# Patient Record
Sex: FEMALE | Race: WHITE | Employment: UNEMPLOYED | URBAN - METROPOLITAN AREA
[De-identification: names, ages, dates, MRNs, and addresses within clinical notes are randomized per-mention and may not be internally consistent; named-entity substitution may affect disease eponyms.]

---

## 2018-08-02 ENCOUNTER — HOSPITAL ENCOUNTER (EMERGENCY)
Age: 33
Discharge: HOME | End: 2018-08-02
Attending: EMERGENCY MEDICINE
Payer: COMMERCIAL

## 2018-08-02 VITALS
HEIGHT: 63 IN | BODY MASS INDEX: 19.14 KG/M2 | DIASTOLIC BLOOD PRESSURE: 77 MMHG | WEIGHT: 108 LBS | HEART RATE: 68 BPM | RESPIRATION RATE: 16 BRPM | OXYGEN SATURATION: 99 % | TEMPERATURE: 98.4 F | SYSTOLIC BLOOD PRESSURE: 113 MMHG

## 2018-08-02 DIAGNOSIS — R11.2 NON-INTRACTABLE VOMITING WITH NAUSEA, UNSPECIFIED VOMITING TYPE: Primary | ICD-10-CM

## 2018-08-02 LAB
BACTERIA: ABNORMAL /HPF
BASOPHILS ABSOLUTE: 0.1 K/UL (ref 0–0.2)
BASOPHILS RELATIVE PERCENT: 1 %
BILIRUBIN URINE: NEGATIVE
BLOOD, URINE: NEGATIVE
CASTS: ABNORMAL /LPF
CLARITY: CLEAR
COLOR: YELLOW
EOSINOPHILS ABSOLUTE: 0 K/UL (ref 0–0.6)
EOSINOPHILS RELATIVE PERCENT: 0.6 %
EPITHELIAL CELLS, UA: ABNORMAL /HPF
GLUCOSE URINE: NEGATIVE MG/DL
HCG QUALITATIVE: NEGATIVE
HCT VFR BLD CALC: 39.5 % (ref 36–48)
HEMOGLOBIN: 13.2 G/DL (ref 12–16)
KETONES, URINE: NEGATIVE MG/DL
LEUKOCYTE ESTERASE, URINE: NEGATIVE
LIPASE: 25 U/L (ref 13–60)
LYMPHOCYTES ABSOLUTE: 1.3 K/UL (ref 1–5.1)
LYMPHOCYTES RELATIVE PERCENT: 17.5 %
MCH RBC QN AUTO: 31.3 PG (ref 26–34)
MCHC RBC AUTO-ENTMCNC: 33.4 G/DL (ref 31–36)
MCV RBC AUTO: 93.8 FL (ref 80–100)
MICROSCOPIC EXAMINATION: YES
MONOCYTES ABSOLUTE: 0.3 K/UL (ref 0–1.3)
MONOCYTES RELATIVE PERCENT: 3.8 %
MUCUS: ABNORMAL /LPF
NEUTROPHILS ABSOLUTE: 5.7 K/UL (ref 1.7–7.7)
NEUTROPHILS RELATIVE PERCENT: 77.1 %
NITRITE, URINE: NEGATIVE
PDW BLD-RTO: 13 % (ref 12.4–15.4)
PH UA: 8.5
PLATELET # BLD: 252 K/UL (ref 135–450)
PMV BLD AUTO: 8.5 FL (ref 5–10.5)
PROTEIN UA: ABNORMAL MG/DL
RBC # BLD: 4.21 M/UL (ref 4–5.2)
RBC UA: ABNORMAL /HPF (ref 0–2)
SPECIFIC GRAVITY UA: 1.01
URINE REFLEX TO CULTURE: ABNORMAL
URINE TYPE: ABNORMAL
UROBILINOGEN, URINE: 0.2 E.U./DL
WBC # BLD: 7.4 K/UL (ref 4–11)
WBC UA: ABNORMAL /HPF (ref 0–5)

## 2018-08-02 PROCEDURE — 83690 ASSAY OF LIPASE: CPT

## 2018-08-02 PROCEDURE — 81001 URINALYSIS AUTO W/SCOPE: CPT

## 2018-08-02 PROCEDURE — 6360000002 HC RX W HCPCS: Performed by: EMERGENCY MEDICINE

## 2018-08-02 PROCEDURE — 99284 EMERGENCY DEPT VISIT MOD MDM: CPT

## 2018-08-02 PROCEDURE — 96360 HYDRATION IV INFUSION INIT: CPT

## 2018-08-02 PROCEDURE — 84703 CHORIONIC GONADOTROPIN ASSAY: CPT

## 2018-08-02 PROCEDURE — 2580000003 HC RX 258: Performed by: EMERGENCY MEDICINE

## 2018-08-02 PROCEDURE — 85025 COMPLETE CBC W/AUTO DIFF WBC: CPT

## 2018-08-02 RX ORDER — ONDANSETRON 4 MG/1
4 TABLET, ORALLY DISINTEGRATING ORAL ONCE
Status: COMPLETED | OUTPATIENT
Start: 2018-08-02 | End: 2018-08-02

## 2018-08-02 RX ORDER — 0.9 % SODIUM CHLORIDE 0.9 %
1000 INTRAVENOUS SOLUTION INTRAVENOUS ONCE
Status: COMPLETED | OUTPATIENT
Start: 2018-08-02 | End: 2018-08-02

## 2018-08-02 RX ORDER — ONDANSETRON 4 MG/1
4 TABLET, ORALLY DISINTEGRATING ORAL EVERY 8 HOURS PRN
Qty: 20 TABLET | Refills: 0 | Status: SHIPPED | OUTPATIENT
Start: 2018-08-02 | End: 2018-12-26

## 2018-08-02 RX ADMIN — ONDANSETRON 4 MG: 4 TABLET, ORALLY DISINTEGRATING ORAL at 14:31

## 2018-08-02 RX ADMIN — SODIUM CHLORIDE 1000 ML: 9 INJECTION, SOLUTION INTRAVENOUS at 14:31

## 2018-08-02 ASSESSMENT — PAIN DESCRIPTION - LOCATION: LOCATION: ABDOMEN

## 2018-08-02 ASSESSMENT — PAIN DESCRIPTION - PAIN TYPE: TYPE: ACUTE PAIN

## 2018-08-02 ASSESSMENT — PAIN SCALES - GENERAL: PAINLEVEL_OUTOF10: 4

## 2018-08-02 NOTE — ED NOTES
Report received from [facility].       Victoria Myers RN  08/02/18 2989

## 2018-08-02 NOTE — ED PROVIDER NOTES
(ZOFRAN-ODT) disintegrating tablet 4 mg (4 mg Oral Given 8/2/18 6054)        CLINICAL IMPRESSION  1. Non-intractable vomiting with nausea, unspecified vomiting type        Blood pressure 113/77, pulse 68, temperature 98.4 °F (36.9 °C), resp. rate 16, height 5' 3\" (1.6 m), weight 108 lb (49 kg), SpO2 99 %, not currently breastfeeding. DISPOSITION  Miesha Monaco was discharged to home in stable condition. Patient was given scripts for the following medications. I counseled patient how to take these medications. Discharge Medication List as of 8/2/2018  3:27 PM      START taking these medications    Details   ondansetron (ZOFRAN ODT) 4 MG disintegrating tablet Take 1 tablet by mouth every 8 hours as needed for Nausea or Vomiting, Disp-20 tablet, R-0Print             Follow-up with:  Lucretia Duque  [phone]  Schedule an appointment as soon as possible for a visit in 2 days        DISCLAIMER: This chart was created using Dragon dictation software. Efforts were made by me to ensure accuracy, however some errors may be present due to limitations of this technology and occasionally words are not transcribed correctly.        Sheldon Crandall MD  08/06/18 7180

## 2018-08-02 NOTE — ED NOTES
Pt educated on providing clean catch urine. Pt verbalized understanding. Urine collected and sent to lab.        Lyudmila Palma RN  08/02/18 0460

## 2018-11-22 ENCOUNTER — HOSPITAL ENCOUNTER (EMERGENCY)
Age: 33
Discharge: HOME | End: 2018-11-22
Attending: EMERGENCY MEDICINE
Payer: COMMERCIAL

## 2018-11-22 VITALS
BODY MASS INDEX: 20.94 KG/M2 | SYSTOLIC BLOOD PRESSURE: 115 MMHG | WEIGHT: 118.2 LBS | RESPIRATION RATE: 12 BRPM | DIASTOLIC BLOOD PRESSURE: 72 MMHG | HEIGHT: 63 IN | HEART RATE: 94 BPM | OXYGEN SATURATION: 100 % | TEMPERATURE: 98.5 F

## 2018-11-22 DIAGNOSIS — M79.605 CHRONIC PAIN OF LEFT LOWER EXTREMITY: Primary | ICD-10-CM

## 2018-11-22 DIAGNOSIS — G89.29 CHRONIC PAIN OF LEFT LOWER EXTREMITY: Primary | ICD-10-CM

## 2018-11-22 PROCEDURE — 99283 EMERGENCY DEPT VISIT LOW MDM: CPT

## 2018-11-22 RX ORDER — GABAPENTIN 300 MG/1
300 CAPSULE ORAL 2 TIMES DAILY
COMMUNITY
End: 2021-06-10

## 2018-11-22 ASSESSMENT — PAIN SCALES - GENERAL: PAINLEVEL_OUTOF10: 9

## 2018-11-22 ASSESSMENT — PAIN DESCRIPTION - LOCATION: LOCATION: LEG

## 2018-11-22 ASSESSMENT — PAIN DESCRIPTION - FREQUENCY: FREQUENCY: CONTINUOUS

## 2018-11-22 ASSESSMENT — PAIN DESCRIPTION - PAIN TYPE: TYPE: CHRONIC PAIN

## 2018-11-22 ASSESSMENT — PAIN DESCRIPTION - ORIENTATION: ORIENTATION: LEFT

## 2018-11-22 ASSESSMENT — PAIN DESCRIPTION - DESCRIPTORS: DESCRIPTORS: ACHING;BURNING

## 2018-11-23 NOTE — ED PROVIDER NOTES
hours as needed for Nausea or Vomiting, Disp-20 tablet, R-0Print      QUEtiapine (SEROQUEL) 100 MG tablet Take 100 mg by mouth nightlyHistorical Med      NONFORMULARY Historical Med      ibuprofen (IBU) 600 MG tablet Take 1 tablet by mouth every 8 hours as needed for Pain, Disp-20 tablet, R-1Print      albuterol (PROVENTIL HFA;VENTOLIN HFA) 108 (90 BASE) MCG/ACT inhaler Inhale 2 puffs into the lungs every 6 hours as needed. Allergies   Allergen Reactions    Keflex [Cephalexin] Nausea And Vomiting        Physical Exam       ED Triage Vitals [11/22/18 2035]   BP Temp Temp Source Pulse Resp SpO2 Height Weight   115/72 98.5 °F (36.9 °C) Oral 94 12 100 % 5' 3\" (1.6 m) 118 lb 3.2 oz (53.6 kg)       Physical Exam   Constitutional: She is oriented to person, place, and time. She appears well-developed and well-nourished. HENT:   Head: Normocephalic and atraumatic. Eyes: Pupils are equal, round, and reactive to light. Conjunctivae are normal.   Musculoskeletal: Normal range of motion. She exhibits no edema, tenderness or deformity. Left knee: Normal.        Left lower leg: Normal.   Neurological: She is alert and oriented to person, place, and time. Skin: Skin is warm and dry. No rash noted. No erythema. No pallor. Psychiatric: She has a normal mood and affect. Her behavior is normal. Thought content normal.   Nursing note and vitals reviewed. Diagnostics   Labs:  No results found for this visit on 11/22/18. Radiographs:  No results found.     Procedures/EKG:   Procedures      ED Course and MDM          MDM  Number of Diagnoses or Management Options  Chronic pain of left lower extremity: established and worsening  Risk of Complications, Morbidity, and/or Mortality  Presenting problems: minimal  Diagnostic procedures: minimal  Management options: minimal        In brief, Ihsan Martinez is a 29 y.o. female who presented to the emergency department With complaints of pain to her left proximal tibia

## 2018-12-26 ENCOUNTER — HOSPITAL ENCOUNTER (EMERGENCY)
Age: 33
Discharge: HOME | End: 2018-12-26
Attending: EMERGENCY MEDICINE
Payer: COMMERCIAL

## 2018-12-26 VITALS
SYSTOLIC BLOOD PRESSURE: 105 MMHG | TEMPERATURE: 98 F | BODY MASS INDEX: 20.91 KG/M2 | HEIGHT: 63 IN | WEIGHT: 118 LBS | RESPIRATION RATE: 14 BRPM | DIASTOLIC BLOOD PRESSURE: 61 MMHG | OXYGEN SATURATION: 100 % | HEART RATE: 92 BPM

## 2018-12-26 DIAGNOSIS — K92.1 BLACK STOOL: ICD-10-CM

## 2018-12-26 DIAGNOSIS — R19.7 NAUSEA VOMITING AND DIARRHEA: ICD-10-CM

## 2018-12-26 DIAGNOSIS — R11.2 NAUSEA VOMITING AND DIARRHEA: ICD-10-CM

## 2018-12-26 DIAGNOSIS — G44.89 OTHER HEADACHE SYNDROME: Primary | ICD-10-CM

## 2018-12-26 LAB
BACTERIA: ABNORMAL /HPF
BILIRUBIN URINE: ABNORMAL
BLOOD, URINE: NEGATIVE
CLARITY: CLEAR
COLOR: YELLOW
EPITHELIAL CELLS, UA: ABNORMAL /HPF
GLUCOSE URINE: NEGATIVE MG/DL
HCG(URINE) PREGNANCY TEST: NEGATIVE
KETONES, URINE: >=80 MG/DL
LEUKOCYTE ESTERASE, URINE: ABNORMAL
MICROSCOPIC EXAMINATION: YES
MUCUS: ABNORMAL /LPF
NITRITE, URINE: NEGATIVE
PH UA: 7.5
PROTEIN UA: ABNORMAL MG/DL
RBC UA: ABNORMAL /HPF (ref 0–2)
SPECIFIC GRAVITY UA: 1.02
URINE TYPE: ABNORMAL
UROBILINOGEN, URINE: 1 E.U./DL
WBC UA: ABNORMAL /HPF (ref 0–5)

## 2018-12-26 PROCEDURE — 6370000000 HC RX 637 (ALT 250 FOR IP): Performed by: EMERGENCY MEDICINE

## 2018-12-26 PROCEDURE — 84703 CHORIONIC GONADOTROPIN ASSAY: CPT

## 2018-12-26 PROCEDURE — 99284 EMERGENCY DEPT VISIT MOD MDM: CPT

## 2018-12-26 PROCEDURE — 81001 URINALYSIS AUTO W/SCOPE: CPT

## 2018-12-26 PROCEDURE — 6360000002 HC RX W HCPCS: Performed by: EMERGENCY MEDICINE

## 2018-12-26 RX ORDER — ONDANSETRON 4 MG/1
8 TABLET, ORALLY DISINTEGRATING ORAL ONCE
Status: COMPLETED | OUTPATIENT
Start: 2018-12-26 | End: 2018-12-26

## 2018-12-26 RX ORDER — FAMOTIDINE 20 MG/1
20 TABLET, FILM COATED ORAL 2 TIMES DAILY
Qty: 60 TABLET | Refills: 0 | Status: SHIPPED | OUTPATIENT
Start: 2018-12-26 | End: 2021-07-28

## 2018-12-26 RX ORDER — MIRTAZAPINE 15 MG/1
15 TABLET, FILM COATED ORAL NIGHTLY
COMMUNITY

## 2018-12-26 RX ORDER — FAMOTIDINE 20 MG/1
20 TABLET, FILM COATED ORAL ONCE
Status: COMPLETED | OUTPATIENT
Start: 2018-12-26 | End: 2018-12-26

## 2018-12-26 RX ORDER — ONDANSETRON 4 MG/1
4 TABLET, FILM COATED ORAL EVERY 8 HOURS PRN
Qty: 10 TABLET | Refills: 0 | Status: SHIPPED | OUTPATIENT
Start: 2018-12-26

## 2018-12-26 RX ADMIN — ONDANSETRON 8 MG: 4 TABLET, ORALLY DISINTEGRATING ORAL at 21:11

## 2018-12-26 RX ADMIN — FAMOTIDINE 20 MG: 20 TABLET ORAL at 21:11

## 2018-12-26 ASSESSMENT — PAIN SCALES - GENERAL: PAINLEVEL_OUTOF10: 10

## 2018-12-26 ASSESSMENT — PAIN DESCRIPTION - PAIN TYPE: TYPE: ACUTE PAIN

## 2018-12-26 ASSESSMENT — PAIN DESCRIPTION - FREQUENCY: FREQUENCY: INTERMITTENT

## 2018-12-26 ASSESSMENT — PAIN DESCRIPTION - LOCATION: LOCATION: HEAD;ABDOMEN

## 2018-12-26 ASSESSMENT — PAIN DESCRIPTION - DESCRIPTORS: DESCRIPTORS: POUNDING

## 2018-12-27 NOTE — ED PROVIDER NOTES
albuterol (PROVENTIL HFA;VENTOLIN HFA) 108 (90 BASE) MCG/ACT inhaler Inhale 2 puffs into the lungs every 6 hours as needed. Allergies   Allergen Reactions    Keflex [Cephalexin] Nausea And Vomiting     Nursing Notes Reviewed    Physical Exam:  ED Triage Vitals [12/26/18 2014]   Enc Vitals Group      /61      Pulse 92      Resp 14      Temp 98 °F (36.7 °C)      Temp Source Oral      SpO2 100 %      Weight 118 lb (53.5 kg)      Height 5' 3\" (1.6 m)      Head Circumference       Peak Flow       Pain Score       Pain Loc       Pain Edu? Excl. in [address]? CONSTITUTIONAL:  Well-nourished well-hydrated. Thin adult female resting comfortably, talking on phone. Polite pleasant. NAD. Awake alert and oriented ×3. Cooperative. Follows commands. GCS of 15  HEAD: Normocephalic atraumatic head. EYES: Pupils equal round and reactive to light. EOMi. Conjunctiva pink   ENT: Nares clear. MMM. Posterior pharynx no erythema swelling lesions. Uvula is midline. NECK: Supple. Full range of motion. Trachea is midline  CARDIOLOGY: Adequate peripheral perfusion. S1 and S2.  Regular rate and rhythm. No murmurs rubs or gallops. No JVD. No lower extremity edema. +2 radial pulses bilaterally  RESPIRATORY: Nonlabored. Clear to auscultation bilaterally with no rhonchi wheezes or crackles. ABDOMEN: Positive bowel sounds. Soft nondistended nontender. No CVA tenderness. Rectal: no lesions rash noted. Good rectal tone. No stool in vault. No blood noted on finger tip. Brown mucus. Attempted to send for hemoccult however specimen not adequate. MUSCULOSKELETAL: No gross deformities. Moves all four extremities, good strength and tone in all four extremities. SKIN: No rash. No petechiae. Warm and dry. NEUROLOGY: Sensation is intact. MDM:  Differential diagnosis includes Viral bacterial gastroenteritis flu UTI GI bleed etc.  Abdomen is benign. Vital signs are stable. Does not appear to be anemic. Urine pregnancy test is negative. Urinalysis does show ketones. Was medicated. Is able to tolerate p.o. Encouraged to increase p.o. fluid intake. Also shows trace leukocyte Estrace WBCs bacteria. However patient denies urinary symptoms of dysuria urgency frequency hematuria. No change in urine output odor color. And so culture was sent. We'll discharge with close follow-up. Zofran Pepcid. Clear liquid brat diet. Tylenol for pain control. No NSAIDS with reported black stools. Increase fluid hydration. Close follow-up with primary care doctor as well as GI regarding the reported black stools. Agreeable does verbalize understanding and has no further questions    Final Impression:  1. Acute nausea vomiting diarrhea  2. Dehydration  3. Headache  4. Reported black stools    Discharge referral (if applicable):  No follow-up provider specified.     Discharge medications (if applicable):  New Prescriptions    No medications on file       DISPOSITION: home  CONDITION: fair    (Please note that portions of this note may have been completed with a voice recognition program. Efforts were made to edit the dictations but occasionally words are mis-transcribed.)    MD Chelly Cintron MD  12/27/18 9630

## 2019-05-09 ENCOUNTER — HOSPITAL ENCOUNTER (EMERGENCY)
Age: 34
Discharge: HOME | End: 2019-05-09
Payer: COMMERCIAL

## 2019-05-09 ENCOUNTER — APPOINTMENT (OUTPATIENT)
Dept: GENERAL RADIOLOGY | Age: 34
End: 2019-05-09
Payer: COMMERCIAL

## 2019-05-09 VITALS
OXYGEN SATURATION: 100 % | HEIGHT: 63 IN | BODY MASS INDEX: 20.73 KG/M2 | HEART RATE: 99 BPM | WEIGHT: 117 LBS | DIASTOLIC BLOOD PRESSURE: 82 MMHG | SYSTOLIC BLOOD PRESSURE: 121 MMHG | TEMPERATURE: 98.9 F | RESPIRATION RATE: 18 BRPM

## 2019-05-09 DIAGNOSIS — S39.012A STRAIN OF LUMBAR REGION, INITIAL ENCOUNTER: Primary | ICD-10-CM

## 2019-05-09 LAB — HCG(URINE) PREGNANCY TEST: NEGATIVE

## 2019-05-09 PROCEDURE — 97161 PT EVAL LOW COMPLEX 20 MIN: CPT

## 2019-05-09 PROCEDURE — 72100 X-RAY EXAM L-S SPINE 2/3 VWS: CPT

## 2019-05-09 PROCEDURE — 97110 THERAPEUTIC EXERCISES: CPT

## 2019-05-09 PROCEDURE — 99283 EMERGENCY DEPT VISIT LOW MDM: CPT

## 2019-05-09 PROCEDURE — 97140 MANUAL THERAPY 1/> REGIONS: CPT

## 2019-05-09 PROCEDURE — 6370000000 HC RX 637 (ALT 250 FOR IP): Performed by: PHYSICIAN ASSISTANT

## 2019-05-09 PROCEDURE — 84703 CHORIONIC GONADOTROPIN ASSAY: CPT

## 2019-05-09 RX ORDER — MIRTAZAPINE 15 MG/1
30 TABLET, FILM COATED ORAL NIGHTLY
COMMUNITY
Start: 2019-04-23

## 2019-05-09 RX ORDER — CYCLOBENZAPRINE HCL 10 MG
10 TABLET ORAL ONCE
Status: COMPLETED | OUTPATIENT
Start: 2019-05-09 | End: 2019-05-09

## 2019-05-09 RX ORDER — IBUPROFEN 600 MG/1
600 TABLET ORAL EVERY 8 HOURS PRN
Qty: 20 TABLET | Refills: 0 | Status: SHIPPED | OUTPATIENT
Start: 2019-05-09

## 2019-05-09 RX ORDER — IBUPROFEN 600 MG/1
600 TABLET ORAL ONCE
Status: COMPLETED | OUTPATIENT
Start: 2019-05-09 | End: 2019-05-09

## 2019-05-09 RX ORDER — MELOXICAM 7.5 MG/1
7.5 TABLET ORAL DAILY
COMMUNITY
Start: 2019-05-02

## 2019-05-09 RX ORDER — LIDOCAINE 4 G/G
1 PATCH TOPICAL ONCE
Status: DISCONTINUED | OUTPATIENT
Start: 2019-05-09 | End: 2019-05-09 | Stop reason: HOSPADM

## 2019-05-09 RX ORDER — CYCLOBENZAPRINE HCL 10 MG
10 TABLET ORAL 3 TIMES DAILY PRN
Qty: 20 TABLET | Refills: 0 | Status: SHIPPED | OUTPATIENT
Start: 2019-05-09 | End: 2019-05-16

## 2019-05-09 RX ORDER — CYCLOBENZAPRINE HCL 10 MG
10 TABLET ORAL 3 TIMES DAILY PRN
COMMUNITY
Start: 2019-04-05

## 2019-05-09 RX ADMIN — CYCLOBENZAPRINE HYDROCHLORIDE 10 MG: 10 TABLET, FILM COATED ORAL at 15:45

## 2019-05-09 RX ADMIN — IBUPROFEN 600 MG: 600 TABLET, FILM COATED ORAL at 15:45

## 2019-05-09 ASSESSMENT — PAIN DESCRIPTION - PAIN TYPE: TYPE: ACUTE PAIN

## 2019-05-09 ASSESSMENT — PAIN DESCRIPTION - ORIENTATION: ORIENTATION: MID

## 2019-05-09 ASSESSMENT — PAIN SCALES - GENERAL
PAINLEVEL_OUTOF10: 6
PAINLEVEL_OUTOF10: 6

## 2019-05-09 ASSESSMENT — PAIN DESCRIPTION - FREQUENCY: FREQUENCY: CONTINUOUS

## 2019-05-09 ASSESSMENT — PAIN DESCRIPTION - DESCRIPTORS: DESCRIPTORS: ACHING

## 2019-05-09 ASSESSMENT — PAIN DESCRIPTION - LOCATION: LOCATION: BACK

## 2019-05-09 ASSESSMENT — ENCOUNTER SYMPTOMS
GASTROINTESTINAL NEGATIVE: 1
BACK PAIN: 1

## 2019-05-09 NOTE — PROGRESS NOTES
Outpatient Physical Therapy   Phone: [phone], Fax: [phone]   ED Physical Therapy Phone: [phone]    LUMBOSACRAL PHYSICAL THERAPY EVALUATION  EMERGENCY DEPARTMENT     Received referral for Physical Therapy Evaluation in the Emergency Department. Pt educated to role of PT in the ED, and pt agreeable to proceed with evaluation. Evaluation Date: 5/9/2019     Patient Name: Irene Gonzalez   YOB: 1985    Medical Diagnosis:  Back pain  Treatment Diagnosis:  SIJ dysfunction  Onset Date:   5/6/19   Referral Date: 5/9/2019    Referring Provider: Kira Hester PA-C  Restrictions/Precautions:        SUBJECTIVE FINDINGS       History of Present Illness:      Pt presents with c/o low back pain. Pt states that the pain has gotten worse in the past three days. Pain is localized to L low back and radiates to L thigh and groin. Denies numbness/tingling. Pain       Patient describes pain to be shooting, grabbing, stabbing   Patient reports 4/10 pain at present and 6/10 pain at its worst.  Worsened by bending over, long sitting  Improved by warm bath   Pt. reports pain with coughing, sneezing and laughing:  []   Yes [x]   No   Pt. reports bowel and bladder changes:   []   Yes []   No   Current Functional Limitations:    PLOF:    Pt's sleep is affected? []   Yes []   No  []   N/A    OBJECTIVE FINDINGS       Imaging Results:    Xray in ED, 5/9/18:      FINDINGS:   Lumbar vertebral bodies are normal in height and alignment. No evidence of   fracture. Visualized sacrum is unremarkable.       No significant degenerative changes.        Posture    []   Forward head  []   Forward flexed trunk   []   Pronounced CT junction    []   Increased thoracic kyphosis   [x]   Increased lumbar lordosis   []   Scoliosis   []   Swayback  []   Decreased WB on   []   R   []   L   []   Scapular winging  []   Other:       Palpation      Patient reported tenderness with palpation  [x]   Yes   []   No   [] NT  Location:  + Graham's sign L SIJ, L lumbar paraspinals  PT notes warmth  []   Yes   [x]   No   []   NT  Location:   PT notes increased muscle tone   []   Yes   [x]   No   []   NT  Location:   Ligament tenderness/provocation:   [x]   Yes   []   No   []   NT  Location:   L SIJ    Gait/Steps/Balance       Deviations on a level linoleum surface include dec stance time LLE, dec step length LLE    [x]  All balance WFL unless otherwise noted below:  Static/ Dynamic Sitting:  Static/Dynamic Standing:    Quick Tests/Functional Myotome Tests:     Heel Walk (L4): [x]   Able to perform WNL       []   Unable to perform         []   NT    Toe Walk (S1):  [x]   Able to perform WNL     []   Unable to perform     []   NT    SI Special Tests     SI Special Test Findings   Standing Landmarks [x]  Iliac Crests Equal   [] R High  [] L High  []  PSIS Equal   []  R High  [x]  L High     Standing Flexion Test []   WFL     [x]   Positive R []   Positive L   Seated Landmarks [x]  Iliac Crests Equal   []  R High   [] L High  []  PSIS Equal   []  R High  [x]  L High     Seated Flexion Test []   WFL     [x]   Positive R []   Positive L   Sit up Test/Long sit test []   NEG     [x]   Positive - Comment below:  R short to long   Sacral Sulci Test [x]   [facility]     []   Positive R []   Positive L   SI distraction [x]   NEG      []   Positive R []   Positive L     SI compression [x]   NEG     []   Positive R []   Positive L   Sacral thrust tests []   NEG      []   Positive R [x]   Positive L   Prone knee bend test []   NEG     [x]   Positive     []   NT  Comments:     Lumbar Special Tests     Lumbar Special Test Findings   Straight Leg Raise [x]   NEG    []   Positive R []   Positive L   Crams []   NEG    []   Positive R []   Positive L   Dural Tension/Slump test []   NEG    []   Positive R []   Positive L   Distraction [x]   NEG    []   Inc pain []   Dec pain   Compression [x]   NEG    []   Inc pain []   Dec pain     Lumbar Range of Motion/Strength Testing      [x] All WNL except as marked below  ROM (*denotes pain) AROM PROM COMMENTS   Flexion 25% normal range in standing  Able to achieve full range in sitting   Extension 50% normal range     Sidebending Left 75% normal range     Sidebending Right 75% normal range     Rotation Left    []   Seated  []   Standing       Rotation Right    []   Seated  []   Standing         Sensation/Motor Function   [x] All dermatomes WNL except as marked below   [x] All  myotomes WNL except as marked below      Dermatome Left Right Myotome Left Right   Anterior groin, 2-3 inches below ASIS (L1-L2)   Hip Flexion (L1-L2)     Middle third anterior thigh (L3)   Hip adduction (L3)     Patella and med malleolus (L4)   Knee extension (L3,4)     Fibular head and dorsum of foot (L5)   Ankle dorsiflexion (L4)     Lateral side and plantar surface of foot (S1)   Hip abduction (L5)     Medial aspect of posterior thigh (S2)   Great toe extension (L5)     Perianal area (S3,4)   Knee flexion (L5,S1)        Ankle plantarflexion (S1,2)       Reflexes/Trunk Strength    [x] All WNL except as marked below     Reflex Left Right Strength Strength   Quadriceps (L3,4) 2+ 2+ Trunk Extensors    Achilles (S1,2) 2+ 2+ Gluteals    Ankle clonus neg neg Abdominals    Juan          Flexibility       Hip flexors/Philippe:  []   WFL []   Tight      Hamstrings:    []   WFL []   Tight       Gastrocs:    []   WFL []   Tight   Obers:    []   WFL []   Tight       TREATMENT  Educated on anatomy, physiology, and biomechanics of SIJ, pelvic alignment, lumbar spine - with visual aids. Pt verbalized understanding and all of patient's questions answered to satisfaction. Instructed to ice over L SIJ. Business card and HEP issued.      Manual tx:   R supine lumbopelvic roll  MET correction of R post pelvic rotation  Long axis traction of RLE and LLE in plane of the SIJ    Therex:  Knee rocks, bridging, hand/heel rock    Equipment: N/A      ASSESSMENT     Pt presenting to ED with c/o back pain. Through evaluation and examination, identified findings consistent with SIJ dysfunction. Preformed manual therapy and issued HEP. PT recommending pt follow-up with OP PT. Discussed findings and recommendations with referring provider. PLAN OF CARE     One time visit in the ED. Thank you for the referral of this patient.       Timed Code Treatment Minutes:  25   minutes    Total Treatment Time: [facility], DPT # [id number]

## 2019-05-09 NOTE — ED NOTES
Reviewed discharge instructions with pt, verbalized understanding,  Denies questions at this time. Ambulated off unit without assistance.       Yue Villanueva RN  05/09/19 1228
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
## 2021-05-26 ENCOUNTER — APPOINTMENT (OUTPATIENT)
Dept: GENERAL RADIOLOGY | Age: 36
End: 2021-05-26
Payer: COMMERCIAL

## 2021-05-26 ENCOUNTER — HOSPITAL ENCOUNTER (EMERGENCY)
Age: 36
Discharge: HOME | End: 2021-05-26
Payer: COMMERCIAL

## 2021-05-26 ENCOUNTER — APPOINTMENT (OUTPATIENT)
Dept: CT IMAGING | Age: 36
End: 2021-05-26
Payer: COMMERCIAL

## 2021-05-26 VITALS
OXYGEN SATURATION: 100 % | WEIGHT: 116 LBS | HEIGHT: 63 IN | RESPIRATION RATE: 16 BRPM | TEMPERATURE: 98.6 F | DIASTOLIC BLOOD PRESSURE: 78 MMHG | HEART RATE: 62 BPM | SYSTOLIC BLOOD PRESSURE: 132 MMHG | BODY MASS INDEX: 20.55 KG/M2

## 2021-05-26 DIAGNOSIS — N76.0 BACTERIAL VAGINOSIS: ICD-10-CM

## 2021-05-26 DIAGNOSIS — B96.89 BACTERIAL VAGINOSIS: ICD-10-CM

## 2021-05-26 DIAGNOSIS — R10.12 LEFT UPPER QUADRANT ABDOMINAL PAIN: Primary | ICD-10-CM

## 2021-05-26 LAB
A/G RATIO: 1.7 (ref 1.1–2.2)
ALBUMIN SERPL-MCNC: 4.8 G/DL (ref 3.4–5)
ALP BLD-CCNC: 56 U/L (ref 40–129)
ALT SERPL-CCNC: 8 U/L (ref 10–40)
ANION GAP SERPL CALCULATED.3IONS-SCNC: 9 MMOL/L (ref 3–16)
AST SERPL-CCNC: 9 U/L (ref 15–37)
BACTERIA WET PREP: ABNORMAL
BASOPHILS ABSOLUTE: 0 K/UL (ref 0–0.2)
BASOPHILS RELATIVE PERCENT: 0.4 %
BILIRUB SERPL-MCNC: 0.4 MG/DL (ref 0–1)
BILIRUBIN URINE: NEGATIVE
BLOOD, URINE: NEGATIVE
BUN BLDV-MCNC: 13 MG/DL (ref 7–20)
CALCIUM SERPL-MCNC: 9.3 MG/DL (ref 8.3–10.6)
CHLORIDE BLD-SCNC: 104 MMOL/L (ref 99–110)
CLARITY: CLEAR
CLUE CELLS: ABNORMAL
CO2: 25 MMOL/L (ref 21–32)
COLOR: YELLOW
CREAT SERPL-MCNC: 0.8 MG/DL (ref 0.6–1.1)
D DIMER: 329 NG/ML DDU (ref 0–229)
EKG ATRIAL RATE: 58 BPM
EKG DIAGNOSIS: NORMAL
EKG P AXIS: 83 DEGREES
EKG P-R INTERVAL: 126 MS
EKG Q-T INTERVAL: 414 MS
EKG QRS DURATION: 76 MS
EKG QTC CALCULATION (BAZETT): 406 MS
EKG R AXIS: 47 DEGREES
EKG T AXIS: 10 DEGREES
EKG VENTRICULAR RATE: 58 BPM
EOSINOPHILS ABSOLUTE: 0 K/UL (ref 0–0.6)
EOSINOPHILS RELATIVE PERCENT: 0.2 %
EPITHELIAL CELLS WET PREP: ABNORMAL
GFR AFRICAN AMERICAN: >60
GFR NON-AFRICAN AMERICAN: >60
GLOBULIN: 2.8 G/DL
GLUCOSE BLD-MCNC: 89 MG/DL (ref 70–99)
GLUCOSE URINE: NEGATIVE MG/DL
HCG QUALITATIVE: NEGATIVE
HCT VFR BLD CALC: 41.6 % (ref 36–48)
HEMOGLOBIN: 13.9 G/DL (ref 12–16)
KETONES, URINE: ABNORMAL MG/DL
LEUKOCYTE ESTERASE, URINE: NEGATIVE
LIPASE: 23 U/L (ref 13–60)
LYMPHOCYTES ABSOLUTE: 1.2 K/UL (ref 1–5.1)
LYMPHOCYTES RELATIVE PERCENT: 12.4 %
MCH RBC QN AUTO: 31.6 PG (ref 26–34)
MCHC RBC AUTO-ENTMCNC: 33.5 G/DL (ref 31–36)
MCV RBC AUTO: 94.5 FL (ref 80–100)
MICROSCOPIC EXAMINATION: ABNORMAL
MONOCYTES ABSOLUTE: 0.8 K/UL (ref 0–1.3)
MONOCYTES RELATIVE PERCENT: 7.9 %
NEUTROPHILS ABSOLUTE: 7.9 K/UL (ref 1.7–7.7)
NEUTROPHILS RELATIVE PERCENT: 79.1 %
NITRITE, URINE: NEGATIVE
PDW BLD-RTO: 13.1 % (ref 12.4–15.4)
PH UA: 6 (ref 5–8)
PLATELET # BLD: 299 K/UL (ref 135–450)
PMV BLD AUTO: 9.1 FL (ref 5–10.5)
POTASSIUM REFLEX MAGNESIUM: 3.8 MMOL/L (ref 3.5–5.1)
PROTEIN UA: NEGATIVE MG/DL
RBC # BLD: 4.4 M/UL (ref 4–5.2)
RBC WET PREP: ABNORMAL
SODIUM BLD-SCNC: 138 MMOL/L (ref 136–145)
SOURCE WET PREP: ABNORMAL
SPECIFIC GRAVITY UA: >=1.03 (ref 1–1.03)
TOTAL PROTEIN: 7.6 G/DL (ref 6.4–8.2)
TRICHOMONAS PREP: ABNORMAL
TROPONIN: <0.01 NG/ML
URINE REFLEX TO CULTURE: ABNORMAL
URINE TYPE: ABNORMAL
UROBILINOGEN, URINE: 1 E.U./DL
WBC # BLD: 10 K/UL (ref 4–11)
WBC WET PREP: ABNORMAL
YEAST WET PREP: ABNORMAL

## 2021-05-26 PROCEDURE — 96374 THER/PROPH/DIAG INJ IV PUSH: CPT

## 2021-05-26 PROCEDURE — 84484 ASSAY OF TROPONIN QUANT: CPT

## 2021-05-26 PROCEDURE — 81003 URINALYSIS AUTO W/O SCOPE: CPT

## 2021-05-26 PROCEDURE — 6360000004 HC RX CONTRAST MEDICATION: Performed by: NURSE PRACTITIONER

## 2021-05-26 PROCEDURE — 83690 ASSAY OF LIPASE: CPT

## 2021-05-26 PROCEDURE — 85379 FIBRIN DEGRADATION QUANT: CPT

## 2021-05-26 PROCEDURE — 36415 COLL VENOUS BLD VENIPUNCTURE: CPT

## 2021-05-26 PROCEDURE — 71260 CT THORAX DX C+: CPT

## 2021-05-26 PROCEDURE — 6360000002 HC RX W HCPCS: Performed by: NURSE PRACTITIONER

## 2021-05-26 PROCEDURE — 85025 COMPLETE CBC W/AUTO DIFF WBC: CPT

## 2021-05-26 PROCEDURE — 87591 N.GONORRHOEAE DNA AMP PROB: CPT

## 2021-05-26 PROCEDURE — 87210 SMEAR WET MOUNT SALINE/INK: CPT

## 2021-05-26 PROCEDURE — 6370000000 HC RX 637 (ALT 250 FOR IP): Performed by: NURSE PRACTITIONER

## 2021-05-26 PROCEDURE — 2580000003 HC RX 258: Performed by: NURSE PRACTITIONER

## 2021-05-26 PROCEDURE — 93010 ELECTROCARDIOGRAM REPORT: CPT | Performed by: INTERNAL MEDICINE

## 2021-05-26 PROCEDURE — 74177 CT ABD & PELVIS W/CONTRAST: CPT

## 2021-05-26 PROCEDURE — 84703 CHORIONIC GONADOTROPIN ASSAY: CPT

## 2021-05-26 PROCEDURE — 87491 CHLMYD TRACH DNA AMP PROBE: CPT

## 2021-05-26 PROCEDURE — 99283 EMERGENCY DEPT VISIT LOW MDM: CPT

## 2021-05-26 PROCEDURE — 80053 COMPREHEN METABOLIC PANEL: CPT

## 2021-05-26 PROCEDURE — 93005 ELECTROCARDIOGRAM TRACING: CPT | Performed by: NURSE PRACTITIONER

## 2021-05-26 PROCEDURE — 71046 X-RAY EXAM CHEST 2 VIEWS: CPT

## 2021-05-26 RX ORDER — 0.9 % SODIUM CHLORIDE 0.9 %
1000 INTRAVENOUS SOLUTION INTRAVENOUS ONCE
Status: COMPLETED | OUTPATIENT
Start: 2021-05-26 | End: 2021-05-26

## 2021-05-26 RX ORDER — METRONIDAZOLE 500 MG/1
500 TABLET ORAL 2 TIMES DAILY
Qty: 14 TABLET | Refills: 0 | Status: SHIPPED | OUTPATIENT
Start: 2021-05-26 | End: 2021-06-02

## 2021-05-26 RX ORDER — ONDANSETRON 2 MG/ML
4 INJECTION INTRAMUSCULAR; INTRAVENOUS ONCE
Status: COMPLETED | OUTPATIENT
Start: 2021-05-26 | End: 2021-05-26

## 2021-05-26 RX ORDER — ONDANSETRON 4 MG/1
4 TABLET, FILM COATED ORAL EVERY 8 HOURS PRN
Qty: 20 TABLET | Refills: 0 | Status: SHIPPED | OUTPATIENT
Start: 2021-05-26

## 2021-05-26 RX ORDER — METRONIDAZOLE 250 MG/1
500 TABLET ORAL ONCE
Status: COMPLETED | OUTPATIENT
Start: 2021-05-26 | End: 2021-05-26

## 2021-05-26 RX ORDER — DICYCLOMINE HYDROCHLORIDE 10 MG/1
10 CAPSULE ORAL
Qty: 120 CAPSULE | Refills: 3 | Status: SHIPPED | OUTPATIENT
Start: 2021-05-26

## 2021-05-26 RX ADMIN — SODIUM CHLORIDE 1000 ML: 9 INJECTION, SOLUTION INTRAVENOUS at 12:28

## 2021-05-26 RX ADMIN — IOPAMIDOL 85 ML: 755 INJECTION, SOLUTION INTRAVENOUS at 13:38

## 2021-05-26 RX ADMIN — METRONIDAZOLE 500 MG: 250 TABLET ORAL at 14:32

## 2021-05-26 RX ADMIN — ONDANSETRON HYDROCHLORIDE 4 MG: 2 INJECTION, SOLUTION INTRAMUSCULAR; INTRAVENOUS at 12:27

## 2021-05-26 ASSESSMENT — ENCOUNTER SYMPTOMS
SORE THROAT: 0
VOMITING: 0
COLOR CHANGE: 0
NAUSEA: 0
SHORTNESS OF BREATH: 0
RHINORRHEA: 0
DIARRHEA: 0
ABDOMINAL PAIN: 1

## 2021-05-26 NOTE — ED PROVIDER NOTES
Evaluated by [id number] [facility] Provider          [facility] ED  EMERGENCY DEPARTMENT ENCOUNTER        Pt Name: Jalyn Tsang  MRN: [id number]  [place] 1985  Dateof evaluation: 5/26/2021  Provider: MILO Horowitz - CNP  PCP: No primary care provider on file. ED Attending: No att. providers found    [address] [place] [address]       Chief Complaint   Patient presents with    Abdominal Pain     Pt reports LUQ abdominal pain, pt report pain is also in her back, pt also reports urinating in small amounts. At times pain seems positional, denies n/v/d at triage. HISTORY OF PRESENTILLNESS   (Location/Symptom, Timing/Onset, Context/Setting, Quality, Duration, Modifying Factors, Severity)  Note limiting factors. Jalyn Tsang is a 30 y.o. female for abd pain. Onset was 5 days ago. Context includes patient reports that she started with left upper and left lower quadrant pain 5 days ago. Patient states that she has had decreased urine output as well. She denies any fever nausea vomiting diarrhea. Patient reports that she does have chest pain that is reproducible and worse with a deep breath. Patient states that she is currently being treated with steroids for a thoracic strain. Alleviating factors include nothing. Aggravating factors include nothing. Pain is 10/10. Nothing has been used for pain today. Nursing Notes were all reviewed and agreed with or any disagreements were addressed  in the HPI. REVIEW OF SYSTEMS    (2-9 systems for level 4, 10 or more for level 5)     Review of Systems   Constitutional: Negative for fever. HENT: Negative for congestion, rhinorrhea and sore throat. Respiratory: Negative for shortness of breath. Cardiovascular: Positive for chest pain. Gastrointestinal: Positive for abdominal pain. Negative for diarrhea, nausea and vomiting. Genitourinary: Positive for difficulty urinating. Negative for decreased urine volume.    Musculoskeletal: Negative Laboratory  [facility] [address],  [place], [place]   Phone [phone]   CBC WITH AUTO DIFFERENTIAL - Abnormal; Notable for the following components:    Neutrophils Absolute 7.9 (*)     All other components within normal limits    Narrative:     Performed at:  [facility] [address],  [place], [place]   Phone [phone]   COMPREHENSIVE METABOLIC PANEL W/ REFLEX TO MG FOR LOW K - Abnormal; Notable for the following components:    ALT 8 (*)     AST 9 (*)     All other components within normal limits    Narrative:     Performed at:  [place] [address],  [place], [place] [employer]   Phone [phone]   URINE RT REFLEX TO CULTURE - Abnormal; Notable for the following components:    Ketones, Urine TRACE (*)     All other components within normal limits    Narrative:     Performed at:  [facility] [address],  [place], [place]   Phone [phone]   D-DIMER, QUANTITATIVE - Abnormal; Notable for the following components:    D-Dimer, Quant 329 (*)     All other components within normal limits    Narrative:     Performed at:  [place] [address],  [place], [place]   Phone  DNA, [address] [place]    Narrative:     Performed at:  [place] [address],  [place], [place]   Phone [phone]   HCG, SERUM, QUALITATIVE    Narrative:     Performed at:  [place] [address],  [place], [place]   Phone [phone]   TROPONIN    Narrative:     Performed at:  [facility] [address],  [place], [place] [employer]   Phone [phone]       All other labs werewithin normal range or not returned as of this dictation. EKG:  All EKG's are interpreted by the Emergency Department Physician who was also negative for any acute findings however her wet prep did reveal bacterial vaginosis. Patient was treated with Flagyl in the ED. Patient will be discharged home with instructions on bacterial vaginitis and left upper quadrant tenderness. She is given a GI and a PCP referral encouraged to follow-up. Patient was encouraged to follow-up in the ED for any worsening symptoms. She was also given a prescription for Flagyl. Patient was ultimately discharged home with all questions answered. The patient tolerated their visit well. I have evaluated this patient. My supervising physician was available for consultation. The patient and / or the family were informed of the results of any tests, a time was given to answer questions, a plan was proposed and they agreed with plan. FINAL IMPRESSION      1. Left upper quadrant abdominal pain    2.  Bacterial vaginosis          DISPOSITION/PLAN   DISPOSITION Decision To Discharge 05/26/2021 02:34:14 PM      PATIENT REFERRED TO:  Lucy Solorzano  [phone]  Schedule an appointment as soon as possible for a visit in 1 week  to establish primary care    [facility] ED  [address] [facility]  [phone]    If symptoms worsen    Chencho Sewell DO  [address] [address]  [phone]    Call   as instructed, for re-evaluation      DISCHARGE MEDICATIONS:  Discharge Medication List as of 5/26/2021  2:35 PM      START taking these medications    Details   dicyclomine (BENTYL) 10 MG capsule Take 1 capsule by mouth 4 times daily (before meals and nightly), Disp-120 capsule, R-3Print      metroNIDAZOLE (FLAGYL) 500 MG tablet Take 1 tablet by mouth 2 times daily for 7 days, Disp-14 tablet, R-0Print      ondansetron (ZOFRAN) 4 MG tablet Take 1 tablet by mouth every 8 hours as needed for Nausea, Disp-20 tablet, R-0Print             DISCONTINUED MEDICATIONS:  Discharge Medication List as of 5/26/2021 2:35 PM                 (Please note that portions of this note were completed with a voice recognition program.  Efforts were made to edit the dictations but occasionally words are mis-transcribed.)    MILO Elias CNP (electronically signed)         MILO Elias CNP  05/26/21 5 [facility], MILO Hutchins CNP  05/26/21 1521

## 2021-05-26 NOTE — ED NOTES
Upon completion of triage, pt also reporting episodes of chest pain. MLP present at bedside during this report.       Jordan Rogers RN  05/26/21 3135

## 2021-05-28 LAB
C TRACH DNA GENITAL QL NAA+PROBE: NEGATIVE
N. GONORRHOEAE DNA: NEGATIVE

## 2021-06-10 ENCOUNTER — HOSPITAL ENCOUNTER (EMERGENCY)
Age: 36
Discharge: HOME | End: 2021-06-10
Attending: EMERGENCY MEDICINE
Payer: COMMERCIAL

## 2021-06-10 VITALS
RESPIRATION RATE: 16 BRPM | TEMPERATURE: 98.4 F | BODY MASS INDEX: 19.79 KG/M2 | WEIGHT: 111.7 LBS | SYSTOLIC BLOOD PRESSURE: 111 MMHG | DIASTOLIC BLOOD PRESSURE: 69 MMHG | HEIGHT: 63 IN | HEART RATE: 69 BPM | OXYGEN SATURATION: 100 %

## 2021-06-10 DIAGNOSIS — R10.13 ABDOMINAL PAIN, EPIGASTRIC: Primary | ICD-10-CM

## 2021-06-10 DIAGNOSIS — R10.12 ABDOMINAL PAIN, LEFT UPPER QUADRANT: ICD-10-CM

## 2021-06-10 LAB
A/G RATIO: 1.4 (ref 1.1–2.2)
ALBUMIN SERPL-MCNC: 4.2 G/DL (ref 3.4–5)
ALP BLD-CCNC: 55 U/L (ref 40–129)
ALT SERPL-CCNC: 7 U/L (ref 10–40)
ANION GAP SERPL CALCULATED.3IONS-SCNC: 10 MMOL/L (ref 3–16)
AST SERPL-CCNC: 11 U/L (ref 15–37)
BASOPHILS ABSOLUTE: 0.1 K/UL (ref 0–0.2)
BASOPHILS RELATIVE PERCENT: 1.5 %
BILIRUB SERPL-MCNC: 0.3 MG/DL (ref 0–1)
BILIRUBIN URINE: NEGATIVE
BLOOD, URINE: NEGATIVE
BUN BLDV-MCNC: 9 MG/DL (ref 7–20)
CALCIUM SERPL-MCNC: 9.5 MG/DL (ref 8.3–10.6)
CHLORIDE BLD-SCNC: 104 MMOL/L (ref 99–110)
CLARITY: CLEAR
CO2: 25 MMOL/L (ref 21–32)
COLOR: YELLOW
CREAT SERPL-MCNC: 0.9 MG/DL (ref 0.6–1.1)
EOSINOPHILS ABSOLUTE: 0.2 K/UL (ref 0–0.6)
EOSINOPHILS RELATIVE PERCENT: 3.6 %
GFR AFRICAN AMERICAN: >60
GFR NON-AFRICAN AMERICAN: >60
GLOBULIN: 3 G/DL
GLUCOSE BLD-MCNC: 92 MG/DL (ref 70–99)
GLUCOSE URINE: NEGATIVE MG/DL
HCG(URINE) PREGNANCY TEST: NEGATIVE
HCT VFR BLD CALC: 38.7 % (ref 36–48)
HEMOGLOBIN: 12.8 G/DL (ref 12–16)
KETONES, URINE: NEGATIVE MG/DL
LEUKOCYTE ESTERASE, URINE: NEGATIVE
LIPASE: 20 U/L (ref 13–60)
LYMPHOCYTES ABSOLUTE: 1.7 K/UL (ref 1–5.1)
LYMPHOCYTES RELATIVE PERCENT: 25.1 %
MCH RBC QN AUTO: 31.1 PG (ref 26–34)
MCHC RBC AUTO-ENTMCNC: 33.2 G/DL (ref 31–36)
MCV RBC AUTO: 93.5 FL (ref 80–100)
MICROSCOPIC EXAMINATION: NORMAL
MONOCYTES ABSOLUTE: 0.4 K/UL (ref 0–1.3)
MONOCYTES RELATIVE PERCENT: 6.3 %
NEUTROPHILS ABSOLUTE: 4.3 K/UL (ref 1.7–7.7)
NEUTROPHILS RELATIVE PERCENT: 63.5 %
NITRITE, URINE: NEGATIVE
PDW BLD-RTO: 13 % (ref 12.4–15.4)
PH UA: 6 (ref 5–8)
PLATELET # BLD: 340 K/UL (ref 135–450)
PMV BLD AUTO: 8.3 FL (ref 5–10.5)
POTASSIUM SERPL-SCNC: 4.3 MMOL/L (ref 3.5–5.1)
PROTEIN UA: NEGATIVE MG/DL
RBC # BLD: 4.14 M/UL (ref 4–5.2)
SODIUM BLD-SCNC: 139 MMOL/L (ref 136–145)
SPECIFIC GRAVITY UA: 1.02 (ref 1–1.03)
TOTAL PROTEIN: 7.2 G/DL (ref 6.4–8.2)
URINE REFLEX TO CULTURE: NORMAL
URINE TYPE: NORMAL
UROBILINOGEN, URINE: 0.2 E.U./DL
WBC # BLD: 6.8 K/UL (ref 4–11)

## 2021-06-10 PROCEDURE — 80053 COMPREHEN METABOLIC PANEL: CPT

## 2021-06-10 PROCEDURE — 85025 COMPLETE CBC W/AUTO DIFF WBC: CPT

## 2021-06-10 PROCEDURE — 96374 THER/PROPH/DIAG INJ IV PUSH: CPT

## 2021-06-10 PROCEDURE — 96375 TX/PRO/DX INJ NEW DRUG ADDON: CPT

## 2021-06-10 PROCEDURE — 99284 EMERGENCY DEPT VISIT MOD MDM: CPT

## 2021-06-10 PROCEDURE — 83690 ASSAY OF LIPASE: CPT

## 2021-06-10 PROCEDURE — 6360000002 HC RX W HCPCS: Performed by: EMERGENCY MEDICINE

## 2021-06-10 PROCEDURE — 84703 CHORIONIC GONADOTROPIN ASSAY: CPT

## 2021-06-10 PROCEDURE — 2500000003 HC RX 250 WO HCPCS: Performed by: EMERGENCY MEDICINE

## 2021-06-10 PROCEDURE — 81003 URINALYSIS AUTO W/O SCOPE: CPT

## 2021-06-10 RX ORDER — FAMOTIDINE 20 MG/1
20 TABLET, FILM COATED ORAL 2 TIMES DAILY
Qty: 60 TABLET | Refills: 0 | Status: SHIPPED | OUTPATIENT
Start: 2021-06-10

## 2021-06-10 RX ORDER — TRAMADOL HYDROCHLORIDE 50 MG/1
50 TABLET ORAL EVERY 8 HOURS PRN
Qty: 10 TABLET | Refills: 0 | Status: SHIPPED | OUTPATIENT
Start: 2021-06-10 | End: 2021-06-13

## 2021-06-10 RX ORDER — KETOROLAC TROMETHAMINE 30 MG/ML
30 INJECTION, SOLUTION INTRAMUSCULAR; INTRAVENOUS ONCE
Status: COMPLETED | OUTPATIENT
Start: 2021-06-10 | End: 2021-06-10

## 2021-06-10 RX ORDER — PROPRANOLOL HYDROCHLORIDE 10 MG/1
TABLET ORAL
COMMUNITY
Start: 2021-05-10

## 2021-06-10 RX ADMIN — KETOROLAC TROMETHAMINE 30 MG: 30 INJECTION, SOLUTION INTRAMUSCULAR at 11:31

## 2021-06-10 RX ADMIN — FAMOTIDINE 20 MG: 10 INJECTION, SOLUTION INTRAVENOUS at 11:32

## 2021-06-10 ASSESSMENT — PAIN - FUNCTIONAL ASSESSMENT: PAIN_FUNCTIONAL_ASSESSMENT: 0-10

## 2021-06-10 ASSESSMENT — PAIN DESCRIPTION - PAIN TYPE
TYPE: ACUTE PAIN

## 2021-06-10 ASSESSMENT — PAIN SCALES - GENERAL
PAINLEVEL_OUTOF10: 3
PAINLEVEL_OUTOF10: 3
PAINLEVEL_OUTOF10: 5
PAINLEVEL_OUTOF10: 5

## 2021-06-10 ASSESSMENT — PAIN DESCRIPTION - DESCRIPTORS: DESCRIPTORS: CRAMPING;TIGHTNESS

## 2021-06-10 ASSESSMENT — PAIN DESCRIPTION - ORIENTATION
ORIENTATION: UPPER;MID
ORIENTATION: UPPER
ORIENTATION: UPPER;MID;LEFT

## 2021-06-10 ASSESSMENT — PAIN DESCRIPTION - LOCATION
LOCATION: ABDOMEN
LOCATION: BACK
LOCATION: ABDOMEN

## 2021-06-10 NOTE — ED PROVIDER NOTES
[facility]  EMERGENCY DEPT VISIT      Patient Identification  Camilo Mejia is a 30 y.o. female. Chief Complaint   Abdominal Pain (intermittent for couple weeks. +pain epigastric and LUQ abd. -n/v/d -fever -uti sx's. 5/26 seen at [facility] in [place]. lab work and ct scan normal. prescribed abx, finished medication. )      History of Present Illness: This is a  30 y.o. female who presents ambulatory  to the ED with complaints of left upper quadrant abdominal pain for almost a month. Patient states the pain comes and goes but bothers her worse at night. It feels like a tightening and aching pain. It does radiate through to the back at times. She denies any fever. She has had some nausea but no vomiting. No diarrhea. No melena medic easier. She does get constipated. She was seen in the emergency department on May 26 for similar pain and had CT imaging done and blood work. She was placed on Flagyl for bacterial vaginosis and Bentyl and Zofran. Patient states that she took all the medications but she is still having intermittent pain. Currently the pain is rated 6 out of 10. Past Medical History:   Diagnosis Date    Anxiety     Asthma     Bipolar 1 disorder ([facility] [address]     Depression     Shigella enteritis 10/18/15       History reviewed. No pertinent surgical history. No current facility-administered medications for this encounter. Current Outpatient Medications:     propranolol (INDERAL) 10 MG tablet, TAKE 1 TABLET BY MOUTH 3 (THREE) TIMES DAILY FOR 30 DAYS., Disp: , Rfl:     famotidine (PEPCID) 20 MG tablet, Take 1 tablet by mouth 2 times daily, Disp: 60 tablet, Rfl: 0    traMADol (ULTRAM) 50 MG tablet, Take 1 tablet by mouth every 8 hours as needed for Pain for up to 3 days. Intended supply: 3 days.  Take lowest dose possible to manage pain, Disp: 10 tablet, Rfl: 0    mirtazapine (REMERON) 15 MG tablet, Take 15 mg by mouth nightly, Disp: , Rfl:     ibuprofen (IBU) 600 MG tablet, Take 1 tablet by mouth every 8 hours as needed for Pain, Disp: 20 tablet, Rfl: 1    albuterol (PROVENTIL HFA;VENTOLIN HFA) 108 (90 BASE) MCG/ACT inhaler, Inhale 2 puffs into the lungs every 6 hours as needed. , Disp: , Rfl:     ondansetron (ZOFRAN) 4 MG tablet, Take 1 tablet by mouth every 8 hours as needed for Nausea, Disp: 10 tablet, Rfl: 0    famotidine (PEPCID) 20 MG tablet, Take 1 tablet by mouth 2 times daily, Disp: 60 tablet, Rfl: 0    NONFORMULARY, , Disp: , Rfl:     Allergies   Allergen Reactions    Keflex [Cephalexin] Nausea And Vomiting       Social History     Socioeconomic History    Marital status: Single     Spouse name: Not on file    Number of children: Not on file    Years of education: Not on file    Highest education level: Not on file   Occupational History    Not on file   Tobacco Use    Smoking status: Never Smoker    Smokeless tobacco: Never Used   Substance and Sexual Activity    Alcohol use: No    Drug use: No    Sexual activity: Yes     Partners: Male   Other Topics Concern    Not on file   Social History Narrative    Not on file     Social Determinants of Health     Financial Resource Strain:     Difficulty of Paying Living Expenses:    Food Insecurity:     Worried About Running Out of Food in the Last Year:     Ran Out of Food in the Last Year:    Transportation Needs:     Lack of Transportation (Medical):      Lack of Transportation (Non-Medical):    Physical Activity:     Days of Exercise per Week:     Minutes of Exercise per Session:    Stress:     Feeling of Stress :    Social Connections:     Frequency of Communication with Friends and Family:     Frequency of Social Gatherings with Friends and Family:     Attends Baptism Services:     Active Member of Clubs or Organizations:     Attends Club or Organization Meetings:     Marital Status:    Intimate Partner Violence:     Fear of Current or Ex-Partner:     Emotionally Abused:     Physically Abused:     Sexually mass, adenopathy, or ascites.  Normal aorta. No free air.       Bones/Soft Tissues: No acute abnormality.           Impression   Very small amount of free pelvic fluid present presumably from recent   ovulation.  Prominent bilateral ovarian follicles.  IUD appropriately   position.  Distended gallbladder with biliary sludge.  No finding of acute   cholecystitis.  No renal or ureteral calculus evident.  No other acute   abnormality present.  No abnormality appreciated in the upper left abdomen.               EXAMINATION:   CTA OF THE CHEST 5/26/2021 1:38 pm       TECHNIQUE:   CTA of the chest was performed after the administration of intravenous   contrast.  Multiplanar reformatted images are provided for review.  MIP   images are provided for review. Dose modulation, iterative reconstruction,   and/or weight based adjustment of the mA/kV was utilized to reduce the   radiation dose to as low as reasonably achievable.       COMPARISON:   None.       HISTORY:   ORDERING SYSTEM PROVIDED HISTORY: chest pain elevated dimer on birth control   TECHNOLOGIST PROVIDED HISTORY:   Reason for exam:->chest pain elevated dimer on birth control   Decision Support Exception - unselect if not a suspected or confirmed   emergency medical condition->Emergency Medical Condition (MA)   Reason for Exam: chest pain.  elevated d dimer and on birth control   Acuity: Acute   Type of Exam: Initial       FINDINGS:   Pulmonary Arteries: Pulmonary arteries are adequately opacified for   evaluation.  No evidence of intraluminal filling defect to suggest pulmonary   embolism.  Main pulmonary artery is normal in caliber.       Mediastinum: No evidence of mediastinal lymphadenopathy.  The heart and   pericardium demonstrate no acute abnormality.  There is no acute abnormality   of the thoracic aorta.       Lungs/pleura:  The lungs are without acute process.  No focal consolidation or   pulmonary edema.  No evidence of pleural effusion or pneumothorax.       Upper Abdomen: Limited images of the upper abdomen are unremarkable.       Soft Tissues/Bones: No acute bone or soft tissue abnormality.           Impression   No evidence of pulmonary embolism or acute pulmonary abnormality. Radiology:  Films have been read by radiologist as noted in chart unless otherwise stated.  Other radiologic studies (i.e. CT, MRI, ultrasounds, etc ) have been interpreted by radiologist.     Yael Fernandez    (Results Pending)       Labs:  Results for orders placed or performed during the hospital encounter of 06/10/21   CBC Auto Differential   Result Value Ref Range    WBC 6.8 4.0 - 11.0 K/uL    RBC 4.14 4.00 - 5.20 M/uL    Hemoglobin 12.8 12.0 - 16.0 g/dL    Hematocrit 38.7 36.0 - 48.0 %    MCV 93.5 80.0 - 100.0 fL    MCH 31.1 26.0 - 34.0 pg    MCHC 33.2 31.0 - 36.0 g/dL    RDW 13.0 12.4 - 15.4 %    Platelets 906 136 - 443 K/uL    MPV 8.3 5.0 - 10.5 fL    Neutrophils % 63.5 %    Lymphocytes % 25.1 %    Monocytes % 6.3 %    Eosinophils % 3.6 %    Basophils % 1.5 %    Neutrophils Absolute 4.3 1.7 - 7.7 K/uL    Lymphocytes Absolute 1.7 1.0 - 5.1 K/uL    Monocytes Absolute 0.4 0.0 - 1.3 K/uL    Eosinophils Absolute 0.2 0.0 - 0.6 K/uL    Basophils Absolute 0.1 0.0 - 0.2 K/uL   Comprehensive Metabolic Panel   Result Value Ref Range    Sodium 139 136 - 145 mmol/L    Potassium 4.3 3.5 - 5.1 mmol/L    Chloride 104 99 - 110 mmol/L    CO2 25 21 - 32 mmol/L    Anion Gap 10 3 - 16    Glucose 92 70 - 99 mg/dL    BUN 9 7 - 20 mg/dL    CREATININE 0.9 0.6 - 1.1 mg/dL    GFR Non-African American >60 >60    GFR African American >60 >60    Calcium 9.5 8.3 - 10.6 mg/dL    Total Protein 7.2 6.4 - 8.2 g/dL    Albumin 4.2 3.4 - 5.0 g/dL    Albumin/Globulin Ratio 1.4 1.1 - 2.2    Total Bilirubin 0.3 0.0 - 1.0 mg/dL    Alkaline Phosphatase 55 40 - 129 U/L    ALT 7 (L) 10 - 40 U/L    AST 11 (L) 15 - 37 U/L    Globulin 3.0 g/dL   Lipase   Result Value Ref Range    Lipase 20.0 13.0 - 60.0 U/L   Pregnancy, Urine   Result Value Ref Range    HCG(Urine) Pregnancy Test Negative Detects HCG level >20 MIU/mL   Urinalysis Reflex to Culture    Specimen: Urine, clean catch   Result Value Ref Range    Color, UA Yellow Straw/Yellow    Clarity, UA Clear Clear    Glucose, Ur Negative Negative mg/dL    Bilirubin Urine Negative Negative    Ketones, Urine Negative Negative mg/dL    Specific Gravity, UA 1.020 1.005 - 1.030    Blood, Urine Negative Negative    pH, UA 6.0 5.0 - 8.0    Protein, UA Negative Negative mg/dL    Urobilinogen, Urine 0.2 <2.0 E.U./dL    Nitrite, Urine Negative Negative    Leukocyte Esterase, Urine Negative Negative    Microscopic Examination Not Indicated     Urine Type NotGiven     Urine Reflex to Culture Not Indicated        Treatment in the department:  Patient received the following while in the ED. Medications   ketorolac (TORADOL) injection 30 mg (30 mg Intravenous Given 6/10/21 1131)   famotidine (PEPCID) injection 20 mg (20 mg Intravenous Given 6/10/21 1132)         Repeat exam shows pain improved    Medical decision making:  Patient presents emergency department with several week history of recurring epigastric and left upper quadrant pain radiating through to the back. She has already had CT imaging of her chest, abdomen, and pelvis for the same pain 2 weeks ago. She continues to have no leukocytosis or left shift. No elevation in lipase or LFTs. No signs of UTI. She has not followed up with gastroenterology. Given the possible gallbladder wall thickening and sludge on CT imaging I believe it is very possible that this may represent biliary colic despite the orientation to the left rather than the right. With her normal labs I am not suspecting cholecystitis at this time. I think she would benefit from an outpatient gallbladder ultrasound and/or HIDA scan. Gastritis or peptic ulcer disease is also in the differential and GI referral will be reiterated.     I estimate there is LOW risk for ACUTE APPENDICITIS, BOWEL OBSTRUCTION, CHOLECYSTITIS, COMPLICATED DIVERTICULITIS, INCARCERATED HERNIA, PANCREATITIS, PELVIC INFLAMMATORY DISEASE, PERFORATED BOWEL or ULCER, ECTOPIC PREGNANCY, or TUBO-OVARIAN ABSCESS, OVARIAN TORSION,  thus I consider the discharge disposition reasonable. Also, there is no evidence or peritonitis, sepsis, or toxicity. Floridalma Robles and I have discussed the diagnosis and risks, and we agree with discharging home to follow-up with their primary doctor. We also discussed returning to the Emergency Department immediately if new or worsening symptoms occur. Clinical Impression:  1. Abdominal pain, epigastric    2. Abdominal pain, left upper quadrant        Dispo:  Patient will be discharged at this time. Patient was informed of this decision and agrees with plan. I have discussed lab and xray findings with patient and they understand. Questions were answered to the best of my ability. Discharge vitals:  Blood pressure 111/69, pulse 69, temperature 98.4 °F (36.9 °C), temperature source Oral, resp. rate 16, height 5' 3\" (1.6 m), weight 111 lb 11.2 oz (50.7 kg), SpO2 100 %, not currently breastfeeding. Prescriptions given:   Discharge Medication List as of 6/10/2021  2:28 PM      START taking these medications    Details   !! famotidine (PEPCID) 20 MG tablet Take 1 tablet by mouth 2 times daily, Disp-60 tablet, R-0Print      traMADol (ULTRAM) 50 MG tablet Take 1 tablet by mouth every 8 hours as needed for Pain for up to 3 days. Intended supply: 3 days. Take lowest dose possible to manage pain, Disp-10 tablet, R-0Print       !! - Potential duplicate medications found. Please discuss with provider. This chart was created using Dragon voice recognition software.         Marylu Kaur MD  06/10/21 5132

## 2021-06-13 ENCOUNTER — APPOINTMENT (OUTPATIENT)
Dept: GENERAL RADIOLOGY | Age: 36
End: 2021-06-13
Payer: COMMERCIAL

## 2021-06-13 ENCOUNTER — HOSPITAL ENCOUNTER (EMERGENCY)
Age: 36
Discharge: HOME | End: 2021-06-13
Attending: EMERGENCY MEDICINE
Payer: COMMERCIAL

## 2021-06-13 VITALS
RESPIRATION RATE: 16 BRPM | HEART RATE: 92 BPM | TEMPERATURE: 98.2 F | BODY MASS INDEX: 19.05 KG/M2 | OXYGEN SATURATION: 100 % | DIASTOLIC BLOOD PRESSURE: 90 MMHG | HEIGHT: 63 IN | WEIGHT: 107.5 LBS | SYSTOLIC BLOOD PRESSURE: 117 MMHG

## 2021-06-13 DIAGNOSIS — R10.13 ABDOMINAL PAIN, EPIGASTRIC: Primary | ICD-10-CM

## 2021-06-13 DIAGNOSIS — R11.2 NON-INTRACTABLE VOMITING WITH NAUSEA, UNSPECIFIED VOMITING TYPE: ICD-10-CM

## 2021-06-13 LAB
A/G RATIO: 1.5 (ref 1.1–2.2)
ALBUMIN SERPL-MCNC: 4.5 G/DL (ref 3.4–5)
ALP BLD-CCNC: 54 U/L (ref 40–129)
ALT SERPL-CCNC: 8 U/L (ref 10–40)
ANION GAP SERPL CALCULATED.3IONS-SCNC: 14 MMOL/L (ref 3–16)
AST SERPL-CCNC: 14 U/L (ref 15–37)
BASOPHILS ABSOLUTE: 0.1 K/UL (ref 0–0.2)
BASOPHILS RELATIVE PERCENT: 0.8 %
BILIRUB SERPL-MCNC: 0.4 MG/DL (ref 0–1)
BILIRUBIN URINE: NEGATIVE
BLOOD, URINE: ABNORMAL
BUN BLDV-MCNC: 22 MG/DL (ref 7–20)
CALCIUM SERPL-MCNC: 9.5 MG/DL (ref 8.3–10.6)
CHLORIDE BLD-SCNC: 105 MMOL/L (ref 99–110)
CLARITY: CLEAR
CO2: 22 MMOL/L (ref 21–32)
COLOR: YELLOW
CREAT SERPL-MCNC: 0.9 MG/DL (ref 0.6–1.1)
EOSINOPHILS ABSOLUTE: 0.1 K/UL (ref 0–0.6)
EOSINOPHILS RELATIVE PERCENT: 1.2 %
EPITHELIAL CELLS, UA: NORMAL /HPF (ref 0–5)
GFR AFRICAN AMERICAN: >60
GFR NON-AFRICAN AMERICAN: >60
GLOBULIN: 3 G/DL
GLUCOSE BLD-MCNC: 90 MG/DL (ref 70–99)
GLUCOSE URINE: NEGATIVE MG/DL
HCG(URINE) PREGNANCY TEST: NEGATIVE
HCT VFR BLD CALC: 38.2 % (ref 36–48)
HEMOGLOBIN: 12.6 G/DL (ref 12–16)
KETONES, URINE: 15 MG/DL
LEUKOCYTE ESTERASE, URINE: NEGATIVE
LIPASE: 21 U/L (ref 13–60)
LYMPHOCYTES ABSOLUTE: 1.5 K/UL (ref 1–5.1)
LYMPHOCYTES RELATIVE PERCENT: 15.3 %
MCH RBC QN AUTO: 30.9 PG (ref 26–34)
MCHC RBC AUTO-ENTMCNC: 33.1 G/DL (ref 31–36)
MCV RBC AUTO: 93.4 FL (ref 80–100)
MICROSCOPIC EXAMINATION: YES
MONOCYTES ABSOLUTE: 0.5 K/UL (ref 0–1.3)
MONOCYTES RELATIVE PERCENT: 5.1 %
NEUTROPHILS ABSOLUTE: 7.6 K/UL (ref 1.7–7.7)
NEUTROPHILS RELATIVE PERCENT: 77.6 %
NITRITE, URINE: NEGATIVE
PDW BLD-RTO: 13.3 % (ref 12.4–15.4)
PH UA: 5 (ref 5–8)
PLATELET # BLD: 317 K/UL (ref 135–450)
PMV BLD AUTO: 8.6 FL (ref 5–10.5)
POTASSIUM SERPL-SCNC: 4.2 MMOL/L (ref 3.5–5.1)
PROTEIN UA: NEGATIVE MG/DL
RBC # BLD: 4.09 M/UL (ref 4–5.2)
RBC UA: NORMAL /HPF (ref 0–4)
SODIUM BLD-SCNC: 141 MMOL/L (ref 136–145)
SPECIFIC GRAVITY UA: 1.02 (ref 1–1.03)
TOTAL PROTEIN: 7.5 G/DL (ref 6.4–8.2)
URINE TYPE: ABNORMAL
UROBILINOGEN, URINE: 0.2 E.U./DL
WBC # BLD: 9.8 K/UL (ref 4–11)
WBC UA: NORMAL /HPF (ref 0–5)

## 2021-06-13 PROCEDURE — 6360000002 HC RX W HCPCS: Performed by: EMERGENCY MEDICINE

## 2021-06-13 PROCEDURE — 6370000000 HC RX 637 (ALT 250 FOR IP): Performed by: EMERGENCY MEDICINE

## 2021-06-13 PROCEDURE — 84703 CHORIONIC GONADOTROPIN ASSAY: CPT

## 2021-06-13 PROCEDURE — 80053 COMPREHEN METABOLIC PANEL: CPT

## 2021-06-13 PROCEDURE — 74022 RADEX COMPL AQT ABD SERIES: CPT

## 2021-06-13 PROCEDURE — 99283 EMERGENCY DEPT VISIT LOW MDM: CPT

## 2021-06-13 PROCEDURE — 81001 URINALYSIS AUTO W/SCOPE: CPT

## 2021-06-13 PROCEDURE — 2580000003 HC RX 258: Performed by: EMERGENCY MEDICINE

## 2021-06-13 PROCEDURE — 85025 COMPLETE CBC W/AUTO DIFF WBC: CPT

## 2021-06-13 PROCEDURE — 83690 ASSAY OF LIPASE: CPT

## 2021-06-13 PROCEDURE — 96374 THER/PROPH/DIAG INJ IV PUSH: CPT

## 2021-06-13 RX ORDER — 0.9 % SODIUM CHLORIDE 0.9 %
1000 INTRAVENOUS SOLUTION INTRAVENOUS ONCE
Status: COMPLETED | OUTPATIENT
Start: 2021-06-13 | End: 2021-06-13

## 2021-06-13 RX ORDER — ONDANSETRON 2 MG/ML
4 INJECTION INTRAMUSCULAR; INTRAVENOUS ONCE
Status: COMPLETED | OUTPATIENT
Start: 2021-06-13 | End: 2021-06-13

## 2021-06-13 RX ORDER — ONDANSETRON 4 MG/1
4 TABLET, ORALLY DISINTEGRATING ORAL EVERY 8 HOURS PRN
Qty: 20 TABLET | Refills: 0 | Status: SHIPPED | OUTPATIENT
Start: 2021-06-13

## 2021-06-13 RX ORDER — MORPHINE SULFATE 4 MG/ML
4 INJECTION, SOLUTION INTRAMUSCULAR; INTRAVENOUS ONCE
Status: DISCONTINUED | OUTPATIENT
Start: 2021-06-13 | End: 2021-06-13

## 2021-06-13 RX ADMIN — ONDANSETRON 4 MG: 2 INJECTION INTRAMUSCULAR; INTRAVENOUS at 11:26

## 2021-06-13 RX ADMIN — ALUMINUM HYDROXIDE, MAGNESIUM HYDROXIDE, AND SIMETHICONE: 200; 200; 20 SUSPENSION ORAL at 11:26

## 2021-06-13 RX ADMIN — SODIUM CHLORIDE 1000 ML: 9 INJECTION, SOLUTION INTRAVENOUS at 11:26

## 2021-06-13 ASSESSMENT — PAIN DESCRIPTION - LOCATION: LOCATION: ABDOMEN;BACK

## 2021-06-13 ASSESSMENT — PAIN DESCRIPTION - ORIENTATION: ORIENTATION: MID

## 2021-06-13 ASSESSMENT — PAIN SCALES - GENERAL: PAINLEVEL_OUTOF10: 2

## 2021-06-13 ASSESSMENT — PAIN DESCRIPTION - PAIN TYPE: TYPE: ACUTE PAIN

## 2021-06-13 NOTE — ED PROVIDER NOTES
tablet, TAKE 1 TABLET BY MOUTH 3 (THREE) TIMES DAILY FOR 30 DAYS., Disp: , Rfl:     mirtazapine (REMERON) 15 MG tablet, Take 15 mg by mouth nightly, Disp: , Rfl:     famotidine (PEPCID) 20 MG tablet, Take 1 tablet by mouth 2 times daily, Disp: 60 tablet, Rfl: 0    famotidine (PEPCID) 20 MG tablet, Take 1 tablet by mouth 2 times daily, Disp: 60 tablet, Rfl: 0    ondansetron (ZOFRAN) 4 MG tablet, Take 1 tablet by mouth every 8 hours as needed for Nausea, Disp: 10 tablet, Rfl: 0    NONFORMULARY, , Disp: , Rfl:     ibuprofen (IBU) 600 MG tablet, Take 1 tablet by mouth every 8 hours as needed for Pain, Disp: 20 tablet, Rfl: 1    albuterol (PROVENTIL HFA;VENTOLIN HFA) 108 (90 BASE) MCG/ACT inhaler, Inhale 2 puffs into the lungs every 6 hours as needed. , Disp: , Rfl:     Allergies   Allergen Reactions    Keflex [Cephalexin] Nausea And Vomiting       Social History     Socioeconomic History    Marital status: Single     Spouse name: Not on file    Number of children: Not on file    Years of education: Not on file    Highest education level: Not on file   Occupational History    Not on file   Tobacco Use    Smoking status: Never Smoker    Smokeless tobacco: Never Used   Substance and Sexual Activity    Alcohol use: No    Drug use: No    Sexual activity: Yes     Partners: Male   Other Topics Concern    Not on file   Social History Narrative    Not on file     Social Determinants of Health     Financial Resource Strain:     Difficulty of Paying Living Expenses:    Food Insecurity:     Worried About Running Out of Food in the Last Year:     [address] in the Last Year:    Transportation Needs:     Lack of Transportation (Medical):      Lack of Transportation (Non-Medical):    Physical Activity:     Days of Exercise per Week:     Minutes of Exercise per Session:    Stress:     Feeling of Stress :    Social Connections:     Frequency of Communication with Friends and Family:     Frequency of Social Gatherings with Friends and Family:     Attends [employer] Services:     Active Member of Clubs or Organizations:     Attends Club or Organization Meetings:     Marital Status:    Intimate Partner Violence:     Fear of Current or Ex-Partner:     Emotionally Abused:     Physically Abused:     Sexually Abused:        Nursing Notes Reviewed      ROS:  GENERAL:  No fever, no chills, no diaphoresis+appetite changes  EYES: no eye discharge, no eye redness, no visual changes  ENT: no nasal congestion, no sore throat  CARDIAC: no chest pain  no leg swelling  PULM: no cough, no shortness of breath  ABD: +abdominal pain, + nausea, + vomiting, no diarrhea, no melena or hematochezia  : no dysuria, no hematuria, no urgency, no frequency. No flank pain  MUSCULOSKELETAL: + back pain, no arthralgias, no myalgias  NEURO: no headache, no lightheadedness, no dizziness, no numbness, no weakness, no syncope, no confusion, no speech difficulty  SKIN: no rashes, no erythema, no wounds, no ecchymosis      PHYSICAL EXAM:  GENERAL APPEARANCE: Elaine Acuna is in no acute respiratory distress. Awake and alert. VITAL SIGNS:   ED Triage Vitals [06/13/21 1056]   Enc Vitals Group      BP (!) 117/90      Pulse 117      Resp 16      Temp 98.2 °F (36.8 °C)      Temp Source Oral      SpO2 100 %      Weight 107 lb 8 oz (48.8 kg)      Height 5' 3\" (1.6 m)      Head Circumference       Peak Flow       Pain Score       Pain Loc       Pain Edu? Excl. in [address]? HEAD: Normocephalic, atraumatic. EYES:  Extraocular muscles are intact. Pupils equal round and reactive to light. Conjunctivas are pink. Negative scleral icterus. ENT:  Mucous membranes are moist.  Pharynx without erythema or exudates. NECK: Nontender and supple. No cervical adenopathy. CHEST:  Clear to auscultation bilaterally. No rales, rhonchi, or wheezing. HEART:  Regular rate and regular rhythm. No murmurs.  Strong and equal pulses in the upper and lower extremities. ABDOMEN: Soft,  nondistended, positive bowel sounds. abdomen is tender in epigastrium and LUQ. No rebound. no guarding. MUSCULOSKELETAL: The calves are nontender to palpation. Active range of motion of the upper and lower extremities. No edema. NEUROLOGICAL: Awake, alert and oriented x 3. Power intact in the upper and lower extremities. Sensation is intact to light touch in the upper and lower extremities. Cranial Nerves 2-12 are intact. DERMATOLOGIC: No petechiae, rashes, or ecchymoses. No erythema. PSYCH: normal mood and affect. Normal thought content. ED COURSE AND MEDICAL DECISION MAKING:  I have reviewed Marah Myrick's old records which reveal the following pertinent information:      EXAMINATION:   CT OF THE ABDOMEN AND PELVIS WITH CONTRAST 5/26/2021 1:38 pm       TECHNIQUE:   CT of the abdomen and pelvis was performed with the administration of   intravenous contrast. Multiplanar reformatted images are provided for review. Dose modulation, iterative reconstruction, and/or weight based adjustment of   the mA/kV was utilized to reduce the radiation dose to as low as reasonably   achievable.       COMPARISON:   None.       HISTORY:   ORDERING SYSTEM PROVIDED HISTORY: left flank and luq pain   TECHNOLOGIST PROVIDED HISTORY:   Additional Contrast?->None   Reason for exam:->left flank and luq pain   Decision Support Exception - unselect if not a suspected or confirmed   emergency medical condition->Emergency Medical Condition (MA)   Reason for Exam: left flank pain and luq pain in abdomen, chest pain. elevated d dimer and on birth control   Type of Exam: Initial       FINDINGS:   Lower Chest: Inferior lung bases are clear.  Heart size is normal.       Organs:  No worrisome focal hepatic lesion present.  Mild-to-moderate   periportal edema appears present.  Gallbladder wall is thickened in the   gallbladder appears to contain biliary sludge.  No finding of acute   cholecystitis. Lalit Jensen abdominal organs appear normal.       GI/Bowel: Normal appendix and terminal ileum.  No acute GI abnormality.       Pelvis: IUD appears appropriately positioned in the endometrium.  Endometrium   does appear thickened.  Prominent bilateral ovarian follicles present.  Very   small amount of fluid in the cul-de-sac.  Urinary bladder appears normal.  No   mass or adenopathy.       Peritoneum/Retroperitoneum: No mass, adenopathy, or ascites.  Normal aorta. No free air.       Bones/Soft Tissues: No acute abnormality.           Impression   Very small amount of free pelvic fluid present presumably from recent   ovulation.  Prominent bilateral ovarian follicles.  IUD appropriately   position.  Distended gallbladder with biliary sludge.  No finding of acute   cholecystitis.  No renal or ureteral calculus evident.  No other acute   abnormality present.  No abnormality appreciated in the upper left abdomen.                   EXAMINATION:   CTA OF THE CHEST 5/26/2021 1:38 pm       TECHNIQUE:   CTA of the chest was performed after the administration of intravenous   contrast.  Multiplanar reformatted images are provided for review.  MIP   images are provided for review.  Dose modulation, iterative reconstruction,   and/or weight based adjustment of the mA/kV was utilized to reduce the   radiation dose to as low as reasonably achievable.       COMPARISON:   None.       HISTORY:   ORDERING SYSTEM PROVIDED HISTORY: chest pain elevated dimer on birth control   TECHNOLOGIST PROVIDED HISTORY:   Reason for exam:->chest pain elevated dimer on birth control   Decision Support Exception - unselect if not a suspected or confirmed   emergency medical condition->Emergency Medical Condition (MA)   Reason for Exam: chest pain.  elevated d dimer and on birth control   Acuity: Acute   Type of Exam: Initial       FINDINGS:   Pulmonary Arteries: Pulmonary arteries are adequately opacified for   evaluation.  No evidence of intraluminal filling defect to suggest pulmonary   embolism.  Main pulmonary artery is normal in caliber.       Mediastinum: No evidence of mediastinal lymphadenopathy.  The heart and   pericardium demonstrate no acute abnormality.  There is no acute abnormality   of the thoracic aorta.       Lungs/pleura: The lungs are without acute process.  No focal consolidation or   pulmonary edema.  No evidence of pleural effusion or pneumothorax.       Upper Abdomen: Limited images of the upper abdomen are unremarkable.       Soft Tissues/Bones: No acute bone or soft tissue abnormality.           Impression   No evidence of pulmonary embolism or acute pulmonary abnormality.             Radiology:  Films have been read by radiologist as noted in chart unless otherwise stated. Other radiologic studies (i.e. CT, MRI, ultrasounds, etc ) have been interpreted by radiologist.     XR ACUTE ABD SERIES CHEST 1 VW   Final Result      No acute cardiopulmonary findings. Nonspecific, nonobstructive bowel gas pattern. No evidence of free air.                 Labs:  Results for orders placed or performed during the hospital encounter of 06/13/21   CBC Auto Differential   Result Value Ref Range    WBC 9.8 4.0 - 11.0 K/uL    RBC 4.09 4.00 - 5.20 M/uL    Hemoglobin 12.6 12.0 - 16.0 g/dL    Hematocrit 38.2 36.0 - 48.0 %    MCV 93.4 80.0 - 100.0 fL    MCH 30.9 26.0 - 34.0 pg    MCHC 33.1 31.0 - 36.0 g/dL    RDW 13.3 12.4 - 15.4 %    Platelets 993 995 - 232 K/uL    MPV 8.6 5.0 - 10.5 fL    Neutrophils % 77.6 %    Lymphocytes % 15.3 %    Monocytes % 5.1 %    Eosinophils % 1.2 %    Basophils % 0.8 %    Neutrophils Absolute 7.6 1.7 - 7.7 K/uL    Lymphocytes Absolute 1.5 1.0 - 5.1 K/uL    Monocytes Absolute 0.5 0.0 - 1.3 K/uL    Eosinophils Absolute 0.1 0.0 - 0.6 K/uL    Basophils Absolute 0.1 0.0 - 0.2 K/uL   Comprehensive Metabolic Panel   Result Value Ref Range    Sodium 141 136 - 145 mmol/L    Potassium 4.2 3.5 - 5.1 mmol/L    Chloride 105 99 - 110 mmol/L    CO2 22 21 - 32 mmol/L    Anion Gap 14 3 - 16    Glucose 90 70 - 99 mg/dL    BUN 22 (H) 7 - 20 mg/dL    CREATININE 0.9 0.6 - 1.1 mg/dL    GFR Non-African American >60 >60    GFR African American >60 >60    Calcium 9.5 8.3 - 10.6 mg/dL    Total Protein 7.5 6.4 - 8.2 g/dL    Albumin 4.5 3.4 - 5.0 g/dL    Albumin/Globulin Ratio 1.5 1.1 - 2.2    Total Bilirubin 0.4 0.0 - 1.0 mg/dL    Alkaline Phosphatase 54 40 - 129 U/L    ALT 8 (L) 10 - 40 U/L    AST 14 (L) 15 - 37 U/L    Globulin 3.0 g/dL   Lipase   Result Value Ref Range    Lipase 21.0 13.0 - 60.0 U/L   Pregnancy, Urine   Result Value Ref Range    HCG(Urine) Pregnancy Test Negative Detects HCG level >20 MIU/mL   Urinalysis   Result Value Ref Range    Color, UA Yellow Straw/Yellow    Clarity, UA Clear Clear    Glucose, Ur Negative Negative mg/dL    Bilirubin Urine Negative Negative    Ketones, Urine 15 (A) Negative mg/dL    Specific Gravity, UA 1.025 1.005 - 1.030    Blood, Urine TRACE-INTACT (A) Negative    pH, UA 5.0 5.0 - 8.0    Protein, UA Negative Negative mg/dL    Urobilinogen, Urine 0.2 <2.0 E.U./dL    Nitrite, Urine Negative Negative    Leukocyte Esterase, Urine Negative Negative    Microscopic Examination YES     Urine Type NotGiven    Microscopic Urinalysis   Result Value Ref Range    WBC, UA 0-2 0 - 5 /HPF    RBC, UA 0-2 0 - 4 /HPF    Epithelial Cells, UA 0-1 0 - 5 /HPF       Treatment in the department:  Patient received the following while in the ED. Medications   0.9 % sodium chloride bolus (0 mLs Intravenous Stopped 6/13/21 5464)   ondansetron (ZOFRAN) injection 4 mg (4 mg Intravenous Given 6/13/21 1126)   aluminum & magnesium hydroxide-simethicone (MAALOX) 30 mL, lidocaine viscous hcl (XYLOCAINE) 5 mL (GI COCKTAIL) ( Oral Given 6/13/21 1126)         Repeat exam shows no vomiting since arrival. Tolerating fluids. No significant pain. Medical decision making:  Patient with relatively chronic epigastric and LUQ and back pain for multiple weeks.  Pain is actually not as bad today as it has been in past.  New vomiting, this did begin after taking tramadol and could be medication side effect. Benign exam. No RLQ tenderness. Negative schaeffer sign. Continues to have no fever or leukocytosis. No signficant dehydration on labs. Recently had CT. Has RX for gallbladder ultrasound. Normal LFTs and lipase. May be biliary causes or gastritis/PUD. Recommend followup with GI. I estimate there is LOW risk for ACUTE APPENDICITIS, BOWEL OBSTRUCTION, CHOLECYSTITIS, COMPLICATED DIVERTICULITIS, INCARCERATED HERNIA, PANCREATITIS,  PERFORATED BOWEL or ULCER, ISCHEMIC BOWEL, ABDOMINAL AORTIC ANEURYSM, AORTIC DISSECTION, thus I consider the discharge disposition reasonable. Also, there is no evidence or peritonitis, sepsis, or toxicity. Jarad Camilo and I have discussed the diagnosis and risks, and we agree with discharging home to follow-up with their primary doctor. We also discussed returning to the Emergency Department immediately if new or worsening symptoms occur. Clinical Impression:  1. Abdominal pain, epigastric    2. Non-intractable vomiting with nausea, unspecified vomiting type        Dispo:  Patient will be discharged at this time. Patient was informed of this decision and agrees with plan. I have discussed lab and xray findings with patient and they understand. Questions were answered to the best of my ability. Discharge vitals:  Blood pressure (!) 117/90, pulse 92, temperature 98.2 °F (36.8 °C), temperature source Oral, resp. rate 16, height 5' 3\" (1.6 m), weight 107 lb 8 oz (48.8 kg), SpO2 100 %, not currently breastfeeding.     Prescriptions given:   Discharge Medication List as of 6/13/2021  1:51 PM      START taking these medications    Details   ondansetron (ZOFRAN ODT) 4 MG disintegrating tablet Take 1 tablet by mouth every 8 hours as needed for Nausea or Vomiting, Disp-20 tablet, R-0Normal               This chart was created using Dragon voice recognition software.         Danika Miranda MD  06/14/21 5248

## 2021-07-01 ENCOUNTER — HOSPITAL ENCOUNTER (OUTPATIENT)
Age: 36
Discharge: HOME | End: 2021-07-01
Payer: COMMERCIAL

## 2021-07-01 PROCEDURE — 36415 COLL VENOUS BLD VENIPUNCTURE: CPT

## 2021-07-01 PROCEDURE — 83516 IMMUNOASSAY NONANTIBODY: CPT

## 2021-07-01 PROCEDURE — 82784 ASSAY IGA/IGD/IGG/IGM EACH: CPT

## 2021-07-01 PROCEDURE — 83013 H PYLORI (C-13) BREATH: CPT

## 2021-07-02 LAB
IGA: 194 MG/DL (ref 70–400)
TISSUE TRANSGLUTAMINASE IGA: <0.5 U/ML (ref 0–14)

## 2021-07-03 LAB — H PYLORI BREATH TEST: NEGATIVE

## 2021-07-28 ENCOUNTER — HOSPITAL ENCOUNTER (EMERGENCY)
Age: 36
Discharge: HOME | End: 2021-07-28
Attending: EMERGENCY MEDICINE
Payer: COMMERCIAL

## 2021-07-28 VITALS
SYSTOLIC BLOOD PRESSURE: 97 MMHG | WEIGHT: 114 LBS | TEMPERATURE: 98.6 F | HEART RATE: 98 BPM | RESPIRATION RATE: 16 BRPM | HEIGHT: 63 IN | DIASTOLIC BLOOD PRESSURE: 63 MMHG | BODY MASS INDEX: 20.2 KG/M2 | OXYGEN SATURATION: 96 %

## 2021-07-28 DIAGNOSIS — R09.82 POSTNASAL DRIP: ICD-10-CM

## 2021-07-28 DIAGNOSIS — R51.9 ACUTE NONINTRACTABLE HEADACHE, UNSPECIFIED HEADACHE TYPE: Primary | ICD-10-CM

## 2021-07-28 PROCEDURE — 96374 THER/PROPH/DIAG INJ IV PUSH: CPT

## 2021-07-28 PROCEDURE — 2580000003 HC RX 258: Performed by: EMERGENCY MEDICINE

## 2021-07-28 PROCEDURE — 99283 EMERGENCY DEPT VISIT LOW MDM: CPT

## 2021-07-28 PROCEDURE — 6360000002 HC RX W HCPCS: Performed by: EMERGENCY MEDICINE

## 2021-07-28 PROCEDURE — 96375 TX/PRO/DX INJ NEW DRUG ADDON: CPT

## 2021-07-28 RX ORDER — PANTOPRAZOLE SODIUM 40 MG/1
40 GRANULE, DELAYED RELEASE ORAL
COMMUNITY

## 2021-07-28 RX ORDER — DIPHENHYDRAMINE HYDROCHLORIDE 50 MG/ML
25 INJECTION INTRAMUSCULAR; INTRAVENOUS ONCE
Status: COMPLETED | OUTPATIENT
Start: 2021-07-28 | End: 2021-07-28

## 2021-07-28 RX ORDER — OMEPRAZOLE 20 MG/1
20 CAPSULE, DELAYED RELEASE ORAL DAILY
COMMUNITY

## 2021-07-28 RX ORDER — PROCHLORPERAZINE EDISYLATE 5 MG/ML
10 INJECTION INTRAMUSCULAR; INTRAVENOUS ONCE
Status: COMPLETED | OUTPATIENT
Start: 2021-07-28 | End: 2021-07-28

## 2021-07-28 RX ORDER — 0.9 % SODIUM CHLORIDE 0.9 %
1000 INTRAVENOUS SOLUTION INTRAVENOUS ONCE
Status: COMPLETED | OUTPATIENT
Start: 2021-07-28 | End: 2021-07-28

## 2021-07-28 RX ORDER — KETOROLAC TROMETHAMINE 30 MG/ML
30 INJECTION, SOLUTION INTRAMUSCULAR; INTRAVENOUS ONCE
Status: COMPLETED | OUTPATIENT
Start: 2021-07-28 | End: 2021-07-28

## 2021-07-28 RX ADMIN — PROCHLORPERAZINE EDISYLATE 10 MG: 5 INJECTION INTRAMUSCULAR; INTRAVENOUS at 02:35

## 2021-07-28 RX ADMIN — SODIUM CHLORIDE 1000 ML: 9 INJECTION, SOLUTION INTRAVENOUS at 02:34

## 2021-07-28 RX ADMIN — KETOROLAC TROMETHAMINE 30 MG: 30 INJECTION, SOLUTION INTRAMUSCULAR; INTRAVENOUS at 02:35

## 2021-07-28 RX ADMIN — DIPHENHYDRAMINE HYDROCHLORIDE 25 MG: 50 INJECTION, SOLUTION INTRAMUSCULAR; INTRAVENOUS at 02:34

## 2021-07-28 ASSESSMENT — PAIN SCALES - GENERAL
PAINLEVEL_OUTOF10: 6
PAINLEVEL_OUTOF10: 6
PAINLEVEL_OUTOF10: 1

## 2021-07-28 ASSESSMENT — PAIN - FUNCTIONAL ASSESSMENT: PAIN_FUNCTIONAL_ASSESSMENT: 0-10

## 2021-07-28 ASSESSMENT — PAIN DESCRIPTION - DESCRIPTORS: DESCRIPTORS: ACHING

## 2021-07-28 NOTE — ED PROVIDER NOTES
[address] ED      CHIEF COMPLAINT  Headache (started approx 1400)       HISTORY OF PRESENT ILLNESS  Awais Brooks is a 30 y.o. female  who presents to the ED complaining of headache. Feels like a pressure on top of head, forehead and eyes. Started today and gradually worsening. Feels achy in legs and hips. Has not been out in the heat a lot. No preceding head trauma. Has hx headaches but states been a while since it has been this bad. No vision changes with this today. Feels like if she waited until tomorrow her vision would start to get blurry. Vomited x1. No fevers. No neck stiffness. Feels hot/cold though. Bright lights make it worse. Tried tylenol without relief. No other complaints, modifying factors or associated symptoms. I have reviewed the following from the nursing documentation. Past Medical History:   Diagnosis Date    Anxiety     Asthma     Bipolar 1 disorder ([facility] [address]     Depression     Shigella enteritis 10/18/15     History reviewed. No pertinent surgical history. History reviewed. No pertinent family history. Social History     Socioeconomic History    Marital status: Single     Spouse name: Not on file    Number of children: Not on file    Years of education: Not on file    Highest education level: Not on file   Occupational History    Not on file   Tobacco Use    Smoking status: Never Smoker    Smokeless tobacco: Never Used   Substance and Sexual Activity    Alcohol use: No    Drug use: No    Sexual activity: Yes     Partners: Male   Other Topics Concern    Not on file   Social History Narrative    Not on file     Social Determinants of Health     Financial Resource Strain:     Difficulty of Paying Living Expenses:    Food Insecurity:     Worried About Running Out of Food in the Last Year:     [address] in the Last Year:    Transportation Needs:     Lack of Transportation (Medical):      Lack of Transportation (Non-Medical):    Physical Activity:     Days of Exercise per Week:     Minutes of Exercise per Session:    Stress:     Feeling of Stress :    Social Connections:     Frequency of Communication with Friends and Family:     Frequency of Social Gatherings with Friends and Family:     Attends [employer] Services:     Active Member of Clubs or Organizations:     Attends Club or Organization Meetings:     Marital Status:    Intimate Partner Violence:     Fear of Current or Ex-Partner:     Emotionally Abused:     Physically Abused:     Sexually Abused:      No current facility-administered medications for this encounter. Current Outpatient Medications   Medication Sig Dispense Refill    pantoprazole sodium (PROTONIX) 40 MG PACK packet Take 40 mg by mouth every morning (before breakfast)      omeprazole (PRILOSEC) 20 MG delayed release capsule Take 20 mg by mouth daily      Probiotic Product (PROBIOTIC COLON SUPPORT PO) Take by mouth      ondansetron (ZOFRAN ODT) 4 MG disintegrating tablet Take 1 tablet by mouth every 8 hours as needed for Nausea or Vomiting 20 tablet 0    mirtazapine (REMERON) 15 MG tablet Take 15 mg by mouth nightly      NONFORMULARY       albuterol (PROVENTIL HFA;VENTOLIN HFA) 108 (90 BASE) MCG/ACT inhaler Inhale 2 puffs into the lungs every 6 hours as needed.  propranolol (INDERAL) 10 MG tablet TAKE 1 TABLET BY MOUTH 3 (THREE) TIMES DAILY FOR 30 DAYS.  famotidine (PEPCID) 20 MG tablet Take 1 tablet by mouth 2 times daily 60 tablet 0    ondansetron (ZOFRAN) 4 MG tablet Take 1 tablet by mouth every 8 hours as needed for Nausea 10 tablet 0     Allergies   Allergen Reactions    Keflex [Cephalexin] Nausea And Vomiting       REVIEW OF SYSTEMS  10 systems reviewed, pertinent positives per HPI otherwise noted to be negative.     PHYSICAL EXAM  BP 97/63   Pulse 98   Temp 98.6 °F (37 °C) (Axillary)   Resp 16   Ht 5' 3\" (1.6 m)   Wt 114 lb (51.7 kg)   LMP 06/25/2021   SpO2 96%   BMI 20.19 kg/m² GENERAL APPEARANCE: Awake and alert. Cooperative. No acute distress. HENT: Normocephalic. Atraumatic. Mucous membranes are moist.  No drooling or stridor. NECK: Supple. No cervical lymphadenopathy. No nuchal rigidity. EYES: PERRL. EOM's grossly intact. HEART/CHEST: RRR. No murmurs. 2+ radial pulses b/l. LUNGS: Respirations unlabored. CTAB. Good air exchange. Speaking comfortably in full sentences. ABDOMEN: No tenderness. Soft. Non-distended. No masses. No organomegaly. No guarding or rebound. MUSCULOSKELETAL: No extremity edema. Compartments soft. No deformity. No tenderness in the extremities. All extremities neurovascularly intact. SKIN: Warm and dry. No acute rashes. NEUROLOGICAL: Alert and oriented. CN's 2-12 intact. No gross facial drooping. Strength 5/5, sensation intact. No gross focal deficits. PSYCHIATRIC: Normal mood and affect. LABS  I have reviewed all labs for this visit. No results found for this visit on 07/28/21. RADIOLOGY  None     ED COURSE/MDM  Patient seen and evaluated. Old records reviewed. Patient presenting for evaluation of headache. Also some postnasal drip and possible sinusitis symptoms. However, feel that sinusitis likely to be viral therefore no indication for antibiotics. Will treat supportively with Flonase over-the-counter as needed and/or Mucinex OTC. This was discussed with patient. No red flags regarding her headache to indicate need for a emergent head CT. She was treated with Toradol, Compazine and Benadryl intravenously as well as IV fluids with improvement of her headache. Will discharge with continuation of supportive treatment and treatment as discussed above. Reasons to return to the emergency department were discussed at length and all questions answered at time of discharge.      I estimate there is LOW risk for SUBARACHNOID HEMORRHAGE, MENINGITIS, INTRACRANIAL HEMORRHAGE, SUBDURAL HEMATOMA, OR STROKE, thus I consider the discharge disposition reasonable. Awais Brooks and I have discussed the diagnosis and risks, and we agree with discharging home to follow-up with their primary doctor. We also discussed returning to the Emergency Department immediately if new or worsening symptoms occur. We have discussed the symptoms which are most concerning (e.g., changing or worsening pain, weakness, vomiting, fever) that necessitate immediate return. During the patient's ED course, the patient was given:  Medications   0.9 % sodium chloride bolus (0 mLs Intravenous Stopped 7/28/21 0340)   prochlorperazine (COMPAZINE) injection 10 mg (10 mg Intravenous Given 7/28/21 0235)   diphenhydrAMINE (BENADRYL) injection 25 mg (25 mg Intravenous Given 7/28/21 0234)   ketorolac (TORADOL) injection 30 mg (30 mg Intravenous Given 7/28/21 0235)        CLINICAL IMPRESSION  1. Acute nonintractable headache, unspecified headache type    2. Postnasal drip        Blood pressure 97/63, pulse 98, temperature 98.6 °F (37 °C), temperature source Axillary, resp. rate 16, height 5' 3\" (1.6 m), weight 114 lb (51.7 kg), last menstrual period 06/25/2021, SpO2 96 %, not currently breastfeeding. DISPOSITION  Awais Brooks was discharged to home in stable condition. Patient was given scripts for the following medications. I counseled patient how to take these medications. Discharge Medication List as of 7/28/2021  3:35 AM          Follow-up with:  Jenny Hopkins MD    Schedule an appointment as soon as possible for a visit in 2 days  For recheck      DISCLAIMER: This chart was created using Dragon dictation software. Efforts were made by me to ensure accuracy, however some errors may be present due to limitations of this technology and occasionally words are not transcribed correctly.         Michael Barger MD  07/28/21 8818

## 2021-07-28 NOTE — ED NOTES
Dr. Román Medina notified Pt reports a \"runny nose\" and drainage in the back of her throat.        Alia Barillas RN  07/28/21 1650

## 2024-01-24 ENCOUNTER — OFFICE VISIT (OUTPATIENT)
Dept: URGENT CARE | Age: 39
End: 2024-01-24

## 2024-01-24 VITALS
HEART RATE: 96 BPM | OXYGEN SATURATION: 98 % | DIASTOLIC BLOOD PRESSURE: 66 MMHG | BODY MASS INDEX: 21.26 KG/M2 | SYSTOLIC BLOOD PRESSURE: 93 MMHG | TEMPERATURE: 98.9 F | HEIGHT: 63 IN | WEIGHT: 120 LBS

## 2024-01-24 DIAGNOSIS — J10.1 INFLUENZA A: Primary | ICD-10-CM

## 2024-01-24 DIAGNOSIS — J02.9 PHARYNGITIS, UNSPECIFIED ETIOLOGY: ICD-10-CM

## 2024-01-24 DIAGNOSIS — R68.83 CHILLS: ICD-10-CM

## 2024-01-24 DIAGNOSIS — R51.9 INTRACTABLE HEADACHE, UNSPECIFIED CHRONICITY PATTERN, UNSPECIFIED HEADACHE TYPE: ICD-10-CM

## 2024-01-24 PROBLEM — M51.36 DDD (DEGENERATIVE DISC DISEASE), LUMBAR: Status: ACTIVE | Noted: 2017-10-10

## 2024-01-24 PROBLEM — M51.369 DDD (DEGENERATIVE DISC DISEASE), LUMBAR: Status: ACTIVE | Noted: 2017-10-10

## 2024-01-24 PROBLEM — M54.30 SCIATICA: Status: ACTIVE | Noted: 2017-10-10

## 2024-01-24 PROBLEM — F31.9 BIPOLAR I DISORDER (HCC): Status: ACTIVE | Noted: 2021-01-30

## 2024-01-24 LAB
INFLUENZA VIRUS A RNA: ABNORMAL
INFLUENZA VIRUS B RNA: NEGATIVE

## 2024-01-24 RX ORDER — ALBUTEROL SULFATE 90 UG/1
2 AEROSOL, METERED RESPIRATORY (INHALATION) EVERY 6 HOURS PRN
COMMUNITY

## 2024-01-24 RX ORDER — NAPROXEN 500 MG/1
500 TABLET ORAL 2 TIMES DAILY
COMMUNITY
Start: 2023-12-29

## 2024-01-24 NOTE — PATIENT INSTRUCTIONS
In-clinic flu testing:  Influenza A: POSITIVE.  Influenza B: NEGATIVE.  Capmist prescribed for cough and congestion relief with expectorant therapy  Do not take other decongestants or cough medications while on this medication.  Recommend OTC treatment for symptoms:  ibuprofen (Advil, Motrin) and acetaminophen (Tylenol) for fevers and pain relief.  decongestants (specifically pseudoephedrine) <avoid if you have a history of high blood pressure or heart conditions>, along with antihistamines (Claritin, Zyrtec, Allegra, or Xyzal) and nasal steroid sprays (Flonase) to help with nasal congestion and runny nose.  throat sprays (Cepacol, chloraseptic) for throat pain.  dextromethorphan (Robitussin, Delsym), throat lozenges, or honey (1-2 teaspoons every hour) for cough.  warm teas, humidifiers, nasal lavages, and sleeping in an inclined position are also helpful options that can lessen symptoms.    Follow up with your PCP or return to the clinic in 5 days if symptoms persist or if symptoms worsen.  New Prescriptions    PSEUDOEPHEDRINE-DM-GG 60- MG TABS    Take 1 tablet by mouth 4 times daily as needed (cough and congestion)

## 2024-01-24 NOTE — PROGRESS NOTES
Fredy Myrick (: 1985) is a 38 y.o. female, New patient, here for evaluation of the following chief complaint(s):  Pharyngitis, Headache, and Sinusitis (Last night began to have sore throat headache, pressure behind eyes)      ASSESSMENT/PLAN:    ICD-10-CM    1. Influenza A  J10.1 Pseudoephedrine-DM-GG 60- MG TABS      2. Pharyngitis, unspecified etiology  J02.9 POCT Influenza A/B DNA ([facility] i)      3. Chills  R68.83 POCT Influenza A/B DNA ([facility] i)      4. Intractable headache, unspecified chronicity pattern, unspecified headache type  R51.9 POCT Influenza A/B DNA ([facility] i)          In-clinic flu testing:  Influenza A: POSITIVE.  Influenza B: NEGATIVE.  Low concern for bacterial sinusitis, otitis media, Strep pharyngitis, respiratory distress, pneumonia, bronchitis, and PE.  Bromfed prescribed for cough and congestion relief   Discussed Tamiflu treatment, however pt declined antiviral treatment at this time.  Recommended OTC medication and home remedy treatments for symptomatic relief    Follow up with your PCP or return to the clinic in 5 days if symptoms persist or if symptoms worsen.  The patient tolerated their visit well. The patient and/or the family were informed of the results of any tests, a time was given to answer questions, a plan was proposed and they agreed with plan. Reviewed AVS with treatment instructions and answered questions - pt/family expresses understanding and agreement with the discussed treatment plan and AVS instructions.    SUBJECTIVE/OBJECTIVE:  HPI:   38 y.o. female presents with her 2 sons and daughter for complaint of sore throat, headache, and nasal congestion x yesterday.   Her children are also presenting to be seen for similar symptoms - all symptoms started yesterday.    Also admits symptoms of hot sweats and cold chills, and some mild stomach upset that has since resolved. Also notes increased fatigue, and throat drainage.  Denies symptoms of coughing, n/v/d, body

## 2025-03-05 ENCOUNTER — OFFICE VISIT (OUTPATIENT)
Dept: URGENT CARE | Age: 40
End: 2025-03-05

## 2025-03-05 VITALS
BODY MASS INDEX: 21.68 KG/M2 | DIASTOLIC BLOOD PRESSURE: 72 MMHG | SYSTOLIC BLOOD PRESSURE: 106 MMHG | TEMPERATURE: 98.5 F | HEART RATE: 69 BPM | OXYGEN SATURATION: 98 % | WEIGHT: 122.4 LBS

## 2025-03-05 DIAGNOSIS — R05.1 ACUTE COUGH: ICD-10-CM

## 2025-03-05 DIAGNOSIS — J02.9 SORE THROAT: ICD-10-CM

## 2025-03-05 DIAGNOSIS — R09.82 POSTNASAL DRIP: ICD-10-CM

## 2025-03-05 DIAGNOSIS — J06.9 VIRAL URI: Primary | ICD-10-CM

## 2025-03-05 DIAGNOSIS — R09.81 NASAL CONGESTION: ICD-10-CM

## 2025-03-05 PROBLEM — M54.2 CHRONIC NECK PAIN: Status: RESOLVED | Noted: 2017-09-19 | Resolved: 2025-03-05

## 2025-03-05 PROBLEM — M54.50 CHRONIC LOW BACK PAIN: Chronic | Status: RESOLVED | Noted: 2017-11-27 | Resolved: 2025-03-05

## 2025-03-05 PROBLEM — M53.3 SACROILIAC JOINT DYSFUNCTION OF LEFT SIDE: Status: ACTIVE | Noted: 2018-11-07

## 2025-03-05 PROBLEM — G89.29 CHRONIC NECK PAIN: Status: RESOLVED | Noted: 2017-09-19 | Resolved: 2025-03-05

## 2025-03-05 PROBLEM — G89.29 CHRONIC LOW BACK PAIN: Chronic | Status: RESOLVED | Noted: 2017-11-27 | Resolved: 2025-03-05

## 2025-03-05 PROBLEM — S32.592G: Status: RESOLVED | Noted: 2017-07-05 | Resolved: 2025-03-05

## 2025-03-05 RX ORDER — MIRTAZAPINE 15 MG/1
15 TABLET, FILM COATED ORAL NIGHTLY
COMMUNITY

## 2025-03-05 ASSESSMENT — ENCOUNTER SYMPTOMS
NAUSEA: 0
COUGH: 1
CHEST TIGHTNESS: 0
WHEEZING: 0
SINUS PAIN: 0
RHINORRHEA: 1
VOMITING: 0
SHORTNESS OF BREATH: 0
SORE THROAT: 1
VOICE CHANGE: 0
ABDOMINAL PAIN: 0
SINUS PRESSURE: 0
DIARRHEA: 0

## 2025-03-05 ASSESSMENT — VISUAL ACUITY: OU: 1

## 2025-03-05 NOTE — PATIENT INSTRUCTIONS
throat irritation and coughing fits.  warm teas, humidifiers, nasal lavages, and sleeping in an inclined position are also helpful options that can lessen symptoms.  Recommend warm compresses over the symptomatic ear(s) for 10-15 minutes, or a hot shower, followed by 1-2 minutes of massaging the area behind your ears and down the jaw-line to help with the ear congestion/ear pressure.    Follow up with your PCP within 5 days or, if unable, you can return to the clinic if symptoms persist beyond 5 days or if symptoms worsen.    If you develop shortness of breath, increased work of breathing, lightheadedness, or chest pain, contact 911, or follow up immediately with the nearest Emergency Department for further evaluation.    New Prescriptions    PSEUDOEPHEDRINE-DM-GG 60- MG TABS    Take 1 tablet by mouth 4 times daily as needed (cough and congestion)

## 2025-03-05 NOTE — PROGRESS NOTES
days.    Notes having a \"funny feeling\" burn sensation to the back of her throat. Notes worsens after she eats.    Also admits having some runny nose and congestion.    Nothing makes symptoms better.  Notes throat is more sore after eating, as well as first thing in the morning.    Has attempted OTC allergy medication over the last 2 days for symptoms without any noted relief of symptoms.    Denies any known exposures to others with similar symptoms, especially those with Strep, COVID, or Flu    Patient provided the HPI by themself - the patient is considered to be a reliable historian.          VITAL SIGNS  Vitals:    03/05/25 1206   BP: 106/72   Pulse: 69   Temp: 98.5 °F (36.9 °C)   SpO2: 98%   Weight: 55.5 kg (122 lb 6.4 oz)       Review of Systems   Constitutional:  Negative for chills, fatigue and fever.   HENT:  Positive for congestion, postnasal drip, rhinorrhea and sore throat. Negative for ear pain, sinus pressure, sinus pain and voice change.    Respiratory:  Positive for cough (mild, intermittent). Negative for chest tightness, shortness of breath and wheezing.    Cardiovascular:  Negative for chest pain.   Gastrointestinal:  Negative for abdominal pain, diarrhea, nausea and vomiting.   Musculoskeletal:  Negative for myalgias.   Skin:  Negative for rash.   Neurological:  Positive for headaches.     Pertinent positives and negatives as above, or may be included within the HPI.    Physical Exam  Vitals reviewed.   Constitutional:       General: She is not in acute distress.     Appearance: Normal appearance. She is not ill-appearing.   HENT:      Head: Normocephalic and atraumatic.      Right Ear: Ear canal and external ear normal. No middle ear effusion. No mastoid tenderness. Tympanic membrane is retracted. Tympanic membrane is not injected, scarred, perforated, erythematous or bulging.      Left Ear: Ear canal and external ear normal.  No middle ear effusion. No mastoid tenderness. Tympanic membrane is

## 2025-08-26 ENCOUNTER — OFFICE VISIT (OUTPATIENT)
Dept: URGENT CARE | Age: 40
End: 2025-08-26

## 2025-08-26 VITALS
BODY MASS INDEX: 21.29 KG/M2 | DIASTOLIC BLOOD PRESSURE: 71 MMHG | SYSTOLIC BLOOD PRESSURE: 102 MMHG | TEMPERATURE: 98.6 F | OXYGEN SATURATION: 98 % | WEIGHT: 120.2 LBS | HEART RATE: 72 BPM

## 2025-08-26 DIAGNOSIS — R05.1 ACUTE COUGH: ICD-10-CM

## 2025-08-26 DIAGNOSIS — J02.9 SORE THROAT: ICD-10-CM

## 2025-08-26 DIAGNOSIS — R09.82 POSTNASAL DRIP: ICD-10-CM

## 2025-08-26 DIAGNOSIS — R09.81 NASAL CONGESTION: ICD-10-CM

## 2025-08-26 DIAGNOSIS — J06.9 VIRAL URI: Primary | ICD-10-CM

## 2025-08-26 DIAGNOSIS — J34.89 RHINORRHEA: ICD-10-CM

## 2025-08-26 DIAGNOSIS — R11.0 NAUSEA: ICD-10-CM

## 2025-08-26 PROBLEM — M76.62 LEFT ACHILLES TENDINITIS: Status: RESOLVED | Noted: 2024-05-02 | Resolved: 2025-08-26

## 2025-08-26 PROBLEM — M79.672 FOOT PAIN, LEFT: Status: RESOLVED | Noted: 2024-05-02 | Resolved: 2025-08-26

## 2025-08-26 PROBLEM — M76.72 PERONEAL TENDINITIS OF LEFT LOWER EXTREMITY: Status: RESOLVED | Noted: 2024-05-02 | Resolved: 2025-08-26

## 2025-08-26 PROBLEM — M72.2 PLANTAR FASCIITIS, LEFT: Status: RESOLVED | Noted: 2024-05-02 | Resolved: 2025-08-26

## 2025-08-26 PROBLEM — M77.52: Status: RESOLVED | Noted: 2024-05-02 | Resolved: 2025-08-26

## 2025-08-26 RX ORDER — ONDANSETRON 4 MG/1
4 TABLET, ORALLY DISINTEGRATING ORAL 3 TIMES DAILY PRN
Qty: 21 TABLET | Refills: 0 | Status: SHIPPED | OUTPATIENT
Start: 2025-08-26

## 2025-08-26 ASSESSMENT — ENCOUNTER SYMPTOMS
VOMITING: 1
SORE THROAT: 1
RHINORRHEA: 1
ABDOMINAL PAIN: 0
WHEEZING: 0
DIARRHEA: 0
CHEST TIGHTNESS: 0
VOICE CHANGE: 0
COUGH: 1
SHORTNESS OF BREATH: 0
NAUSEA: 1

## 2025-08-26 ASSESSMENT — VISUAL ACUITY: OU: 1
